# Patient Record
Sex: FEMALE | Race: BLACK OR AFRICAN AMERICAN | Employment: FULL TIME | RURAL
[De-identification: names, ages, dates, MRNs, and addresses within clinical notes are randomized per-mention and may not be internally consistent; named-entity substitution may affect disease eponyms.]

---

## 2018-11-02 ENCOUNTER — OFFICE VISIT (OUTPATIENT)
Dept: FAMILY MEDICINE CLINIC | Age: 43
End: 2018-11-02

## 2018-11-02 VITALS
RESPIRATION RATE: 18 BRPM | HEART RATE: 78 BPM | BODY MASS INDEX: 29.26 KG/M2 | OXYGEN SATURATION: 100 % | WEIGHT: 159 LBS | DIASTOLIC BLOOD PRESSURE: 80 MMHG | SYSTOLIC BLOOD PRESSURE: 126 MMHG | TEMPERATURE: 98 F | HEIGHT: 62 IN

## 2018-11-02 DIAGNOSIS — Z00.00 ROUTINE GENERAL MEDICAL EXAMINATION AT HEALTH CARE FACILITY: Primary | ICD-10-CM

## 2018-11-02 DIAGNOSIS — Z76.89 ENCOUNTER TO ESTABLISH CARE: ICD-10-CM

## 2018-11-02 NOTE — PATIENT INSTRUCTIONS
A Healthy Lifestyle: Care Instructions Your Care Instructions A healthy lifestyle can help you feel good, stay at a healthy weight, and have plenty of energy for both work and play. A healthy lifestyle is something you can share with your whole family. A healthy lifestyle also can lower your risk for serious health problems, such as high blood pressure, heart disease, and diabetes. You can follow a few steps listed below to improve your health and the health of your family. Follow-up care is a key part of your treatment and safety. Be sure to make and go to all appointments, and call your doctor if you are having problems. It's also a good idea to know your test results and keep a list of the medicines you take. How can you care for yourself at home? · Do not eat too much sugar, fat, or fast foods. You can still have dessert and treats now and then. The goal is moderation. · Start small to improve your eating habits. Pay attention to portion sizes, drink less juice and soda pop, and eat more fruits and vegetables. ? Eat a healthy amount of food. A 3-ounce serving of meat, for example, is about the size of a deck of cards. Fill the rest of your plate with vegetables and whole grains. ? Limit the amount of soda and sports drinks you have every day. Drink more water when you are thirsty. ? Eat at least 5 servings of fruits and vegetables every day. It may seem like a lot, but it is not hard to reach this goal. A serving or helping is 1 piece of fruit, 1 cup of vegetables, or 2 cups of leafy, raw vegetables. Have an apple or some carrot sticks as an afternoon snack instead of a candy bar. Try to have fruits and/or vegetables at every meal. 
· Make exercise part of your daily routine. You may want to start with simple activities, such as walking, bicycling, or slow swimming. Try to be active 30 to 60 minutes every day.  You do not need to do all 30 to 60 minutes all at once. For example, you can exercise 3 times a day for 10 or 20 minutes. Moderate exercise is safe for most people, but it is always a good idea to talk to your doctor before starting an exercise program. 
· Keep moving. Jaime Dryer the lawn, work in the garden, or misterbnb. Take the stairs instead of the elevator at work. · If you smoke, quit. People who smoke have an increased risk for heart attack, stroke, cancer, and other lung illnesses. Quitting is hard, but there are ways to boost your chance of quitting tobacco for good. ? Use nicotine gum, patches, or lozenges. ? Ask your doctor about stop-smoking programs and medicines. ? Keep trying. In addition to reducing your risk of diseases in the future, you will notice some benefits soon after you stop using tobacco. If you have shortness of breath or asthma symptoms, they will likely get better within a few weeks after you quit. · Limit how much alcohol you drink. Moderate amounts of alcohol (up to 2 drinks a day for men, 1 drink a day for women) are okay. But drinking too much can lead to liver problems, high blood pressure, and other health problems. Family health If you have a family, there are many things you can do together to improve your health. · Eat meals together as a family as often as possible. · Eat healthy foods. This includes fruits, vegetables, lean meats and dairy, and whole grains. · Include your family in your fitness plan. Most people think of activities such as jogging or tennis as the way to fitness, but there are many ways you and your family can be more active. Anything that makes you breathe hard and gets your heart pumping is exercise. Here are some tips: 
? Walk to do errands or to take your child to school or the bus. 
? Go for a family bike ride after dinner instead of watching TV. Where can you learn more? Go to http://constantin-nel.info/. Enter Y117 in the search box to learn more about \"A Healthy Lifestyle: Care Instructions. \" Current as of: December 7, 2017 Content Version: 11.8 © 1721-8183 Healthwise, [employer]. Care instructions adapted under license by [employer] (which disclaims liability or warranty for this information). If you have questions about a medical condition or this instruction, always ask your healthcare professional. [place] [address] any warranty or liability for your use of this information.

## 2018-11-02 NOTE — PROGRESS NOTES
Subjective:  
  
Devendra Rose is a 44 y.o. female with PMHx notable for sarcoid and thyroid nodules here today to establish care and for her annual physical exam. She has been doing well. No current concerns or complaints at this time. Thyroid nodules: managed by Endocrinology. Has not been seen in some time. Health Maintenance: · Cervical cancer screenin2017, normal per patient report. Reports h/o abnormal Pap in . · Mammogram: , normal per he report · Colonoscopy: has had for diarrhea and normal per report, ~15 years ago · Tetanus: 2014 · Influenza vaccine: 10/26/18 Current Outpatient Medications Medication Sig Dispense Refill  levonorgestrel (MIRENA) 20 mcg/24 hr (5 years) IUD 1 Device by IntraUTERine route once. Placed Aug 2017 Allergies Allergen Reactions  Latex, Natural Rubber Sneezing Past medical history - reviewed. Past Medical History:  
Diagnosis Date  Multiple thyroid nodules  Sarcoid  Uterine fibroid Social history - reviewed. Social History Tobacco Use  Smoking status: Never Smoker  Smokeless tobacco: Never Used Substance Use Topics  Alcohol use: Yes Frequency: Monthly or less Drinks per session: 1 or 2 Binge frequency: Never Family history - reviewed. Family History Problem Relation Age of Onset  Diabetes Mother  Hypertension Mother  No Known Problems Father Review of Systems Constitutional: negative for fevers and chills Eyes: negative for visual disturbance and irritation Ears, nose, mouth, throat, and face: negative for nasal congestion and sore throat Respiratory: negative for cough, sputum or dyspnea on exertion Cardiovascular: negative for chest pain, chest pressure/discomfort, palpitations, irregular heart beats, lower extremity edema Gastrointestinal: negative for nausea, vomiting, change in bowel habits and abdominal pain Genitourinary:negative for frequency, dysuria and hematuria Musculoskeletal:negative for myalgias and arthralgias Neurological: negative for headaches, dizziness and paresthesia Objective:  
 
Visit Vitals /80 (BP 1 Location: Right arm, BP Patient Position: Sitting) Comment: Manual  
Pulse 78 Temp 98 °F (36.7 °C) (Oral) Comment: . Resp 18 Ht 5' 2\" (1.575 m) Wt 159 lb (72.1 kg) LMP 10/17/2018 SpO2 100% BMI 29.08 kg/m² General appearance - alert, well appearing, and in no distress Eyes - pupils equal and reactive, extraocular eye movements intact, sclera anicteric Oropharyngx - mucous membranes moist, pharynx normal without lesions Neck - supple, no significant adenopathy Chest - clear to auscultation, no wheezes, rales or rhonchi, symmetric air entry, no tachypnea, retractions or cyanosis Heart - normal rate, regular rhythm, normal S1, S2, no murmurs, rubs, clicks or gallops Abdomen - soft, nontender, nondistended, no masses or organomegaly 
bowel sounds normal 
Extremities - peripheral pulses normal, no pedal edema, no clubbing or cyanosis Neurologic - alert, oriented, normal speech, no focal findings or movement disorder noted Skin - normal coloration and turgor, no rashes, no suspicious skin lesions noted Mental Status - alert, oriented to person, place, and time, normal mood, behavior, speech, dress, motor activity, and thought processes Assessment/Plan:  
Dorrine Hamman is a 44 y.o. female seen for:  
 
1. Routine general medical examination at health care facility: healthy, check fasting labs. - CBC WITH AUTOMATED DIFF 
- METABOLIC PANEL, COMPREHENSIVE 
- LIPID PANEL 
- THYROID CASCADE PROFILE 2. Body mass index (BMI) of 29.0-29.9 in adult: I have reviewed/discussed the above normal BMI with the patient. I have recommended the following interventions: dietary management education, guidance, and counseling and encourage exercise. 3. Encounter to establish care: previous medical records requested/  
 
 
I have discussed the diagnosis with the patient and the intended plan as seen in the above orders. The patient has received an after-visit summary and questions were answered concerning future plans. I have discussed medication side effects and warnings with the patient as well. Patient verbalizes understanding of plan of care and denies further questions or concerns at this time. Informed patient to return to the office if symptoms worsen or if new symptoms arise. Follow-up Disposition: 
Return in about 1 year (around 11/2/2019) for annual physical exam or sooner as needed.

## 2018-11-02 NOTE — PROGRESS NOTES
Identified pt with two pt identifiers(name and ). Chief Complaint Patient presents with [address] [place] [address] Care  Form Completion Health Maintenance Due Topic  DTaP/Tdap/Td series (1 - Tdap)  PAP AKA CERVICAL CYTOLOGY Wt Readings from Last 3 Encounters:  
18 159 lb (72.1 kg) Temp Readings from Last 3 Encounters:  
18 98 °F (36.7 °C) (Oral) BP Readings from Last 3 Encounters:  
18 126/80 Pulse Readings from Last 3 Encounters:  
18 78 Learning Assessment: 
:  
 
No flowsheet data found. Depression Screening: 
:  
 
PHQ over the last two weeks 2018 Little interest or pleasure in doing things Not at all Feeling down, depressed, irritable, or hopeless Not at all Total Score PHQ 2 0 Fall Risk Assessment: 
:  
 
No flowsheet data found. Abuse Screening: 
:  
 
Abuse Screening Questionnaire 2018 Do you ever feel afraid of your partner? Cosimo Blow Are you in a relationship with someone who physically or mentally threatens you? Cosimo Blow Is it safe for you to go home? Claudette Dunks Coordination of Care Questionnaire: 
:  
 
1) Have you been to an emergency room, urgent care clinic since your last visit? no  
Hospitalized since your last visit? no          
 
2) Have you seen or consulted any other health care providers outside of [address] [place] [address] since your last visit? no  (Include any pap smears or colon screenings in this section.) 3) Do you have an Advance Directive on file? no 
Are you interested in receiving information about Advance Directives? no 
 
Patient is accompanied by mother I have received verbal consent from Farzaneh Rasmussen to discuss any/all medical information while they are present in the room. Reviewed record in preparation for visit and have obtained necessary documentation. Medication reconciliation up to date and corrected with patient at this time.

## 2018-11-03 LAB
ALBUMIN SERPL-MCNC: 4.4 G/DL (ref 3.5–5.5)
ALBUMIN/GLOB SERPL: 1.5 {RATIO} (ref 1.2–2.2)
ALP SERPL-CCNC: 88 IU/L (ref 39–117)
ALT SERPL-CCNC: 22 IU/L (ref 0–32)
AST SERPL-CCNC: 26 IU/L (ref 0–40)
BASOPHILS # BLD AUTO: 0 X10E3/UL (ref 0–0.2)
BASOPHILS NFR BLD AUTO: 1 %
BILIRUB SERPL-MCNC: 0.4 MG/DL (ref 0–1.2)
BUN SERPL-MCNC: 9 MG/DL (ref 6–24)
BUN/CREAT SERPL: 11 (ref 9–23)
CALCIUM SERPL-MCNC: 9.4 MG/DL (ref 8.7–10.2)
CHLORIDE SERPL-SCNC: 102 MMOL/L (ref 96–106)
CHOLEST SERPL-MCNC: 162 MG/DL (ref 100–199)
CO2 SERPL-SCNC: 23 MMOL/L (ref 20–29)
CREAT SERPL-MCNC: 0.8 MG/DL (ref 0.57–1)
EOSINOPHIL # BLD AUTO: 0.2 X10E3/UL (ref 0–0.4)
EOSINOPHIL NFR BLD AUTO: 6 %
ERYTHROCYTE [DISTWIDTH] IN BLOOD BY AUTOMATED COUNT: 14.6 % (ref 12.3–15.4)
GLOBULIN SER CALC-MCNC: 3 G/DL (ref 1.5–4.5)
GLUCOSE SERPL-MCNC: 80 MG/DL (ref 65–99)
HCT VFR BLD AUTO: 42.6 % (ref 34–46.6)
HDLC SERPL-MCNC: 42 MG/DL
HGB BLD-MCNC: 13.9 G/DL (ref 11.1–15.9)
IMM GRANULOCYTES # BLD: 0 X10E3/UL (ref 0–0.1)
IMM GRANULOCYTES NFR BLD: 0 %
INTERPRETATION, 910389: NORMAL
LDLC SERPL CALC-MCNC: 107 MG/DL (ref 0–99)
LYMPHOCYTES # BLD AUTO: 0.9 X10E3/UL (ref 0.7–3.1)
LYMPHOCYTES NFR BLD AUTO: 28 %
MCH RBC QN AUTO: 26.4 PG (ref 26.6–33)
MCHC RBC AUTO-ENTMCNC: 32.6 G/DL (ref 31.5–35.7)
MCV RBC AUTO: 81 FL (ref 79–97)
MONOCYTES # BLD AUTO: 0.3 X10E3/UL (ref 0.1–0.9)
MONOCYTES NFR BLD AUTO: 8 %
NEUTROPHILS # BLD AUTO: 1.9 X10E3/UL (ref 1.4–7)
NEUTROPHILS NFR BLD AUTO: 57 %
PLATELET # BLD AUTO: 169 X10E3/UL (ref 150–379)
POTASSIUM SERPL-SCNC: 4.4 MMOL/L (ref 3.5–5.2)
PROT SERPL-MCNC: 7.4 G/DL (ref 6–8.5)
RBC # BLD AUTO: 5.27 X10E6/UL (ref 3.77–5.28)
SODIUM SERPL-SCNC: 137 MMOL/L (ref 134–144)
TRIGL SERPL-MCNC: 64 MG/DL (ref 0–149)
TSH SERPL DL<=0.005 MIU/L-ACNC: 0.9 UIU/ML (ref 0.45–4.5)
VLDLC SERPL CALC-MCNC: 13 MG/DL (ref 5–40)
WBC # BLD AUTO: 3.3 X10E3/UL (ref 3.4–10.8)

## 2018-11-06 ENCOUNTER — TELEPHONE (OUTPATIENT)
Dept: FAMILY MEDICINE CLINIC | Age: 43
End: 2018-11-06

## 2019-05-14 ENCOUNTER — OFFICE VISIT (OUTPATIENT)
Dept: FAMILY MEDICINE CLINIC | Age: 44
End: 2019-05-14

## 2019-05-14 VITALS
HEIGHT: 62 IN | RESPIRATION RATE: 18 BRPM | WEIGHT: 166.2 LBS | TEMPERATURE: 98.2 F | OXYGEN SATURATION: 99 % | HEART RATE: 82 BPM | SYSTOLIC BLOOD PRESSURE: 129 MMHG | DIASTOLIC BLOOD PRESSURE: 77 MMHG | BODY MASS INDEX: 30.59 KG/M2

## 2019-05-14 DIAGNOSIS — H65.112 ACUTE MUCOID OTITIS MEDIA OF LEFT EAR: Primary | ICD-10-CM

## 2019-05-14 RX ORDER — AMOXICILLIN 500 MG/1
CAPSULE ORAL
COMMUNITY
End: 2020-01-22

## 2019-05-14 RX ORDER — AMOXICILLIN AND CLAVULANATE POTASSIUM 875; 125 MG/1; MG/1
1 TABLET, FILM COATED ORAL 2 TIMES DAILY
Qty: 20 TAB | Refills: 0 | Status: SHIPPED | OUTPATIENT
Start: 2019-05-14 | End: 2019-05-24

## 2019-05-14 RX ORDER — CIPROFLOXACIN 500 MG/1
TABLET ORAL
COMMUNITY
End: 2020-01-22

## 2019-05-14 RX ORDER — CIPROFLOXACIN HYDROCHLORIDE 3.5 MG/ML
SOLUTION/ DROPS TOPICAL
COMMUNITY
End: 2020-01-22

## 2019-05-14 RX ORDER — AZELASTINE HYDROCHLORIDE 0.5 MG/ML
SOLUTION/ DROPS OPHTHALMIC
COMMUNITY
End: 2020-01-22

## 2019-05-14 RX ORDER — AZITHROMYCIN 250 MG/1
TABLET, FILM COATED ORAL
COMMUNITY
End: 2020-01-22

## 2019-05-14 NOTE — PATIENT INSTRUCTIONS

## 2019-05-14 NOTE — PROGRESS NOTES
HISTORY OF PRESENT ILLNESS  Raad Kohler is a 44 y.o. female. HPI  Pt presents with \"left ear pain\"    Pt has been dealing with ear pain for 4 days  She thought her symptoms were allergy related, and she has been taking Claritin D  Now, she is having acute pain in her left ear  Pain is radiating to her jaw as well  No fever  Review of Systems   Constitutional: Negative for fever. HENT: Positive for ear pain. Gastrointestinal: Negative for diarrhea and vomiting. Physical Exam   Constitutional: She is oriented to person, place, and time. She appears well-developed and well-nourished. HENT:   Head: Normocephalic and atraumatic. Right Ear: Hearing, tympanic membrane, external ear and ear canal normal.   Left Ear: Hearing, external ear and ear canal normal. Tympanic membrane is erythematous and retracted. Nose: Nose normal.   Mouth/Throat: Oropharynx is clear and moist.   Neck: Normal range of motion. Neck supple. Cardiovascular: Normal rate, regular rhythm and normal heart sounds. Pulmonary/Chest: Effort normal and breath sounds normal.   Lymphadenopathy:     She has no cervical adenopathy. Neurological: She is alert and oriented to person, place, and time. Skin: Skin is warm and dry. Psychiatric: She has a normal mood and affect. Her behavior is normal.       ASSESSMENT and PLAN    ICD-10-CM ICD-9-CM    1. Acute mucoid otitis media of left ear H65.112 381.02 amoxicillin-clavulanate (AUGMENTIN) 875-125 mg per tablet     Educated about taking medication as prescribed, with food  Should stay well hydrated, and treat fever as needed    Pt informed to return to office with worsening of symptoms, or PRN with any questions or concerns. Pt verbalizes understanding of plan of care and denies further questions or concerns at this time.

## 2019-05-14 NOTE — PROGRESS NOTES
All health maintenance and other pertinent information has been reviewed in preparation for today's office visit. Patient presents in the office today for:    Chief Complaint   Patient presents with    Ear Pain     Pt c/o left ear pain. Onset: 4 days. 1. Have you been to the ER, urgent care clinic since your last visit? Hospitalized since your last visit? No    2. Have you seen or consulted any other health care providers outside of the [address] [place] [address] since your last visit? Include any pap smears or colon screening.  No

## 2019-06-20 DIAGNOSIS — Z11.1 SCREENING FOR TUBERCULOSIS: Primary | ICD-10-CM

## 2020-01-22 ENCOUNTER — OFFICE VISIT (OUTPATIENT)
Dept: FAMILY MEDICINE CLINIC | Age: 45
End: 2020-01-22

## 2020-01-22 VITALS
TEMPERATURE: 98.1 F | HEART RATE: 77 BPM | WEIGHT: 167 LBS | SYSTOLIC BLOOD PRESSURE: 131 MMHG | RESPIRATION RATE: 18 BRPM | OXYGEN SATURATION: 100 % | BODY MASS INDEX: 30.73 KG/M2 | HEIGHT: 62 IN | DIASTOLIC BLOOD PRESSURE: 89 MMHG

## 2020-01-22 DIAGNOSIS — R20.0 RIGHT FACIAL NUMBNESS: Primary | ICD-10-CM

## 2020-01-22 DIAGNOSIS — Z86.19 HISTORY OF LYME DISEASE: ICD-10-CM
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
## 2020-01-22 DIAGNOSIS — Q67.0 ASYMMETRY OF FACE: ICD-10-CM

## 2020-01-22 NOTE — PROGRESS NOTES
Chief Complaint   Patient presents with    Facial Swelling     x 2 days right side of face , possible ear problem     1. Have you been to the ER, urgent care clinic since your last visit? Hospitalized since your last visit? No    2. Have you seen or consulted any other health care providers outside of the [address] [place] [address] since your last visit? Include any pap smears or colon screening.  No

## 2020-01-22 NOTE — PROGRESS NOTES
Taras Mackay is a 38 y.o. female here today to address the following issues:  Chief Complaint   Patient presents with    Facial Swelling     x 2 days right side of face , possible ear problem     Patient here with concerns of right-sided facial swelling and numbness. This started about 2 days ago. She denies any other focal weaknesses. She has a history of hypothyroidism. She states she is due for a thyroid check. She is currently not on medication. She also has has a history of tick bites and Lyme disease. She denies any recent rashes. There is no slurred speech. Patient also has a history of sarcoid. Past Medical History:   Diagnosis Date    Lyme disease     7 years    Multiple thyroid nodules     Sarcoid     Uterine fibroid      Past Surgical History:   Procedure Laterality Date    HX HERNIA REPAIR       Social History     Socioeconomic History    Marital status: SINGLE     Spouse name: Not on file    Number of children: Not on file    Years of education: Not on file    Highest education level: Not on file   Occupational History    Not on file   Social Needs    Financial resource strain: Not on file    Food insecurity:     Worry: Not on file     Inability: Not on file    Transportation needs:     Medical: Not on file     Non-medical: Not on file   Tobacco Use    Smoking status: Never Smoker    Smokeless tobacco: Never Used   Substance and Sexual Activity    Alcohol use: Yes     Frequency: Monthly or less     Drinks per session: 1 or 2     Binge frequency: Never    Drug use: No    Sexual activity: Yes     Partners: Male     Birth control/protection: I.U.D.    Lifestyle    Physical activity:     Days per week: Not on file     Minutes per session: Not on file    Stress: Not on file   Relationships    Social connections:     Talks on phone: Not on file     Gets together: Not on file     Attends Restorationist service: Not on file     Active member of club or organization: Not on file Attends meetings of clubs or organizations: Not on file     Relationship status: Not on file    Intimate partner violence:     Fear of current or ex partner: Not on file     Emotionally abused: Not on file     Physically abused: Not on file     Forced sexual activity: Not on file   Other Topics Concern    Not on file   Social History Narrative    Not on file       Allergies   Allergen Reactions    Latex, Natural Rubber Sneezing       Current Outpatient Medications   Medication Sig    levonorgestrel (MIRENA) 20 mcg/24 hr (5 years) IUD 1 Device by IntraUTERine route once. Placed Aug 2017     No current facility-administered medications for this visit. Review of Systems   Constitutional: Negative for chills and fever. HENT: Negative for congestion and ear pain. Eyes: Negative for blurred vision. Respiratory: Negative for shortness of breath and wheezing. Cardiovascular: Negative for chest pain, palpitations and leg swelling. Gastrointestinal: Negative for abdominal pain, diarrhea, nausea and vomiting. Skin: Negative for rash. Neurological: Positive for sensory change. Negative for speech change. Visit Vitals  /89 (BP 1 Location: Left arm, BP Patient Position: Sitting)   Pulse 77   Temp 98.1 °F (36.7 °C) (Oral)   Resp 18   Ht 5' 2\" (1.575 m)   Wt 167 lb (75.8 kg)   SpO2 100%   BMI 30.54 kg/m²       Physical Exam  General: Well-developed, well-nourished, in no acute distress   Neck: Supple, non-tender, no lymphadenopathy, no carotid bruits   Lungs: Normal symmetric bilateral chest movement. Clear to auscultation bilaterally   CV: Regular rate and rhythm. No murmur or gallops auscultated    Abdomen: Soft, non-tender, bowel sounds positive, no rebound or guarding  Extremities: No edema.      Neurological Exam   Mental status: Alert and oriented to person, place and situation  Language: normal fluency and comprehension; no dysarthria  CN II-XII: PATRICK; EOMI; no nystagmus Fundoscopic exam benign. Visual fields full to confrontation   Facial sensation intact in V1, V2, V3 distribution bilaterally  Face appears asymmetric, when puffing out her cheeks she is limited on the right side and facial strength normal   Hearing is intact to casual conversation. Palate elevates symmetrically  Normal shoulder shrug bilaterally  Tongue protrudes midline, no atrophy, no fasciculations   Sensory: Decreased sensation on the right side of her face otherwise intact light touch and position elsewhere  Motor: Normal bulk, tone, and strength in all 4 extremities. Reflexes: DTRs are symmetric, 2+, no sustained clonus   Coordination: No disdiadochokinesia with normal rapid alternating movements. Normal heel to shin bilaterally and finger to nose. Gait: normal stance and stride  Romberg: negative       No results found for this or any previous visit (from the past 12 hour(s)). 1. Right facial numbness  - CBC WITH AUTOMATED DIFF; Future  - METABOLIC PANEL, COMPREHENSIVE; Future  - TSH 3RD GENERATION; Future  - SED RATE (ESR); Future  - CRP, HIGH SENSITIVITY; Future  - MRI BRAIN W WO CONT; Future  - LIPID PANEL; Future    2. Asymmetry of face  - CBC WITH AUTOMATED DIFF; Future  - METABOLIC PANEL, COMPREHENSIVE; Future  - TSH 3RD GENERATION; Future  - SED RATE (ESR); Future  - CRP, HIGH SENSITIVITY; Future  - MRI BRAIN W WO CONT; Future  - LIPID PANEL; Future    3. Tick bite, sequela  - LYME AB TOTAL W/RFLX W BLOT; Future    4. History of Lyme disease  - LYME AB TOTAL W/RFLX W BLOT; Future    Given that this began about 2 days, will get labs above and order MRI. If any worsening recommend going to the emergency room immediately. If work-up negative, consider trigeminal neuralgia as a possible cause of her symptoms. Will need close follow-up and monitoring. Follow-up and Dispositions    · Return in about 1 week (around 1/29/2020) for Follow up .            Mohan Ordoñez MD, CAQSM, RMSK

## 2020-01-23 ENCOUNTER — HOSPITAL ENCOUNTER (OUTPATIENT)
Dept: MRI IMAGING | Age: 45
Discharge: HOME | End: 2020-01-23
Attending: FAMILY MEDICINE
Payer: COMMERCIAL

## 2020-01-23 ENCOUNTER — TELEPHONE (OUTPATIENT)
Dept: FAMILY MEDICINE CLINIC | Age: 45
End: 2020-01-23

## 2020-01-23 ENCOUNTER — HOSPITAL ENCOUNTER (OUTPATIENT)
Dept: LAB | Age: 45
Discharge: HOME | End: 2020-01-23

## 2020-01-23 VITALS — BODY MASS INDEX: 30.18 KG/M2 | WEIGHT: 165 LBS

## 2020-01-23 DIAGNOSIS — Q67.0 ASYMMETRY OF FACE: ICD-10-CM

## 2020-01-23 DIAGNOSIS — D86.89 NEUROSARCOIDOSIS: Primary | ICD-10-CM
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
## 2020-01-23 DIAGNOSIS — J01.00 ACUTE MAXILLARY SINUSITIS, RECURRENCE NOT SPECIFIED: ICD-10-CM

## 2020-01-23 DIAGNOSIS — R20.0 RIGHT FACIAL NUMBNESS: ICD-10-CM

## 2020-01-23 DIAGNOSIS — Z86.19 HISTORY OF LYME DISEASE: ICD-10-CM

## 2020-01-23 LAB
ALBUMIN SERPL-MCNC: 3.7 G/DL (ref 3.5–5)
ALBUMIN/GLOB SERPL: 0.9 {RATIO} (ref 1.1–2.2)
ALP SERPL-CCNC: 94 U/L (ref 45–117)
ALT SERPL-CCNC: 34 U/L (ref 12–78)
ANION GAP SERPL CALC-SCNC: 3 MMOL/L (ref 5–15)
AST SERPL-CCNC: 23 U/L (ref 15–37)
BASOPHILS # BLD: 0 K/UL (ref 0–0.1)
BASOPHILS NFR BLD: 1 % (ref 0–1)
BILIRUB SERPL-MCNC: 0.4 MG/DL (ref 0.2–1)
BUN SERPL-MCNC: 15 MG/DL (ref 6–20)
BUN/CREAT SERPL: 18 (ref 12–20)
CALCIUM SERPL-MCNC: 9.2 MG/DL (ref 8.5–10.1)
CHLORIDE SERPL-SCNC: 106 MMOL/L (ref 97–108)
CHOLEST SERPL-MCNC: 172 MG/DL
CO2 SERPL-SCNC: 29 MMOL/L (ref 21–32)
COMMENT, HOLDF: NORMAL
CREAT SERPL-MCNC: 0.83 MG/DL (ref 0.55–1.02)
CRP SERPL HS-MCNC: 1.7 MG/L
DIFFERENTIAL METHOD BLD: ABNORMAL
EOSINOPHIL # BLD: 0.2 K/UL (ref 0–0.4)
EOSINOPHIL NFR BLD: 5 % (ref 0–7)
ERYTHROCYTE [DISTWIDTH] IN BLOOD BY AUTOMATED COUNT: 14 % (ref 11.5–14.5)
ERYTHROCYTE [SEDIMENTATION RATE] IN BLOOD: 4 MM/HR (ref 0–20)
GLOBULIN SER CALC-MCNC: 3.9 G/DL (ref 2–4)
GLUCOSE SERPL-MCNC: 82 MG/DL (ref 65–100)
HCT VFR BLD AUTO: 45.5 % (ref 35–47)
HDLC SERPL-MCNC: 48 MG/DL
HDLC SERPL: 3.6 {RATIO} (ref 0–5)
HGB BLD-MCNC: 14.1 G/DL (ref 11.5–16)
IMM GRANULOCYTES # BLD AUTO: 0 K/UL (ref 0–0.04)
IMM GRANULOCYTES NFR BLD AUTO: 0 % (ref 0–0.5)
LDLC SERPL CALC-MCNC: 112.4 MG/DL (ref 0–100)
LIPID PROFILE,FLP: ABNORMAL
LYMPHOCYTES # BLD: 0.7 K/UL (ref 0.8–3.5)
LYMPHOCYTES NFR BLD: 20 % (ref 12–49)
MCH RBC QN AUTO: 26.3 PG (ref 26–34)
MCHC RBC AUTO-ENTMCNC: 31 G/DL (ref 30–36.5)
MCV RBC AUTO: 84.7 FL (ref 80–99)
MONOCYTES # BLD: 0.4 K/UL (ref 0–1)
MONOCYTES NFR BLD: 11 % (ref 5–13)
NEUTS SEG # BLD: 2 K/UL (ref 1.8–8)
NEUTS SEG NFR BLD: 63 % (ref 32–75)
NRBC # BLD: 0 K/UL (ref 0–0.01)
NRBC BLD-RTO: 0 PER 100 WBC
PLATELET # BLD AUTO: 178 K/UL (ref 150–400)
PMV BLD AUTO: 11 FL (ref 8.9–12.9)
POTASSIUM SERPL-SCNC: 4.4 MMOL/L (ref 3.5–5.1)
PROT SERPL-MCNC: 7.6 G/DL (ref 6.4–8.2)
RBC # BLD AUTO: 5.37 M/UL (ref 3.8–5.2)
RBC MORPH BLD: ABNORMAL
SAMPLES BEING HELD,HOLD: NORMAL
SODIUM SERPL-SCNC: 138 MMOL/L (ref 136–145)
TRIGL SERPL-MCNC: 58 MG/DL (ref ?–150)
TSH SERPL DL<=0.05 MIU/L-ACNC: 1.68 UIU/ML (ref 0.36–3.74)
VLDLC SERPL CALC-MCNC: 11.6 MG/DL
WBC # BLD AUTO: 3.3 K/UL (ref 3.6–11)

## 2020-01-23 PROCEDURE — 70553 MRI BRAIN STEM W/O & W/DYE: CPT

## 2020-01-23 PROCEDURE — 74011250636 HC RX REV CODE- 250/636: Performed by: RADIOLOGY

## 2020-01-23 PROCEDURE — A9575 INJ GADOTERATE MEGLUMI 0.1ML: HCPCS | Performed by: RADIOLOGY

## 2020-01-23 RX ORDER — AMOXICILLIN AND CLAVULANATE POTASSIUM 875; 125 MG/1; MG/1
1 TABLET, FILM COATED ORAL EVERY 12 HOURS
Qty: 20 TAB | Refills: 0 | Status: SHIPPED | OUTPATIENT
Start: 2020-01-23 | End: 2020-02-02

## 2020-01-23 RX ORDER — GADOTERATE MEGLUMINE 376.9 MG/ML
15 INJECTION INTRAVENOUS
Status: COMPLETED | OUTPATIENT
Start: 2020-01-23 | End: 2020-01-23

## 2020-01-23 RX ORDER — PREDNISONE 20 MG/1
0.5 TABLET ORAL
Qty: 28 TAB | Refills: 0 | Status: SHIPPED | OUTPATIENT
Start: 2020-01-23 | End: 2020-02-06

## 2020-01-23 RX ADMIN — GADOTERATE MEGLUMINE 15 ML: 376.9 INJECTION INTRAVENOUS at 14:48

## 2020-01-23 NOTE — TELEPHONE ENCOUNTER
I touched base with the patient. Tried to call Radiology, but no answer. 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 Referral made to Neurology and MRI of face ordered.

## 2020-01-23 NOTE — PROGRESS NOTES
Tried to call Radiology, but no answer. 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 Referral made to Neurology and MRI of face ordered.

## 2020-01-24 ENCOUNTER — TELEPHONE (OUTPATIENT)
Dept: FAMILY MEDICINE CLINIC | Age: 45
End: 2020-01-24

## 2020-01-24 LAB — B BURGDOR IGG+IGM SER-ACNC: <0.91 ISR (ref 0–0.9)

## 2020-01-24 NOTE — TELEPHONE ENCOUNTER
Anders Keenan with [facility] Radiology Dept calling, asking that doctor be aware of results of yesterday for MRI BRAIN W/WO CONTRAST      Questions call [phone]

## 2020-01-28 ENCOUNTER — HOSPITAL ENCOUNTER (OUTPATIENT)
Dept: MRI IMAGING | Age: 45
Discharge: HOME | End: 2020-01-28
Attending: FAMILY MEDICINE
Payer: COMMERCIAL

## 2020-01-28 VITALS — WEIGHT: 154 LBS | BODY MASS INDEX: 28.17 KG/M2

## 2020-01-28 DIAGNOSIS — D86.89 NEUROSARCOIDOSIS: ICD-10-CM

## 2020-01-28 PROCEDURE — 74011250636 HC RX REV CODE- 250/636: Performed by: RADIOLOGY

## 2020-01-28 PROCEDURE — A9575 INJ GADOTERATE MEGLUMI 0.1ML: HCPCS | Performed by: RADIOLOGY

## 2020-01-28 PROCEDURE — 70543 MRI ORBT/FAC/NCK W/O &W/DYE: CPT

## 2020-01-28 RX ORDER — GADOTERATE MEGLUMINE 376.9 MG/ML
13 INJECTION INTRAVENOUS
Status: COMPLETED | OUTPATIENT
Start: 2020-01-28 | End: 2020-01-28

## 2020-01-28 RX ADMIN — GADOTERATE MEGLUMINE 13 ML: 376.9 INJECTION INTRAVENOUS at 22:40

## 2020-02-03 DIAGNOSIS — D86.9 SARCOIDOSIS: ICD-10-CM

## 2020-02-03 DIAGNOSIS — D86.89 NEUROSARCOIDOSIS: Primary | ICD-10-CM

## 2020-02-03 RX ORDER — PREDNISONE 10 MG/1
TABLET ORAL
Qty: 18 TAB | Refills: 0 | Status: SHIPPED | OUTPATIENT
Start: 2020-02-03 | End: 2022-01-12

## 2020-02-03 NOTE — PROGRESS NOTES
Patient doing better. After 2 weeks of 40mg, will taper steroids. Has appt with Neurology and will also refer to Pulm to follow up as patient was told by her last pulmonologist that there was evidence of sarcoidosis in her lungs.

## 2020-02-19 DIAGNOSIS — D86.9 SARCOIDOSIS: Primary | ICD-10-CM

## 2020-02-27 ENCOUNTER — OFFICE VISIT (OUTPATIENT)
Dept: NEUROLOGY | Age: 45
End: 2020-02-27

## 2020-02-27 VITALS
BODY MASS INDEX: 30.36 KG/M2 | SYSTOLIC BLOOD PRESSURE: 110 MMHG | HEART RATE: 77 BPM | WEIGHT: 165 LBS | OXYGEN SATURATION: 98 % | DIASTOLIC BLOOD PRESSURE: 80 MMHG | HEIGHT: 62 IN

## 2020-02-27 DIAGNOSIS — R20.0 NUMBNESS AND TINGLING IN RIGHT HAND: ICD-10-CM

## 2020-02-27 DIAGNOSIS — D86.89 NEUROSARCOIDOSIS IN ADULT: Primary | ICD-10-CM

## 2020-02-27 DIAGNOSIS — D86.89 NEUROSARCOIDOSIS IN ADULT: ICD-10-CM

## 2020-02-27 DIAGNOSIS — R20.2 NUMBNESS AND TINGLING IN RIGHT HAND: ICD-10-CM

## 2020-02-27 DIAGNOSIS — R90.89 ABNORMAL BRAIN MRI: ICD-10-CM

## 2020-02-27 NOTE — PROGRESS NOTES
Numbness of the face down to jaw happened last month. Dx with sarcoidosis by PCP and that why she was referred here. Has noticed numbness and weakness of the right hand. Ear on right side has been bothering here; sounds like a fan in her ear. She is forgetful but not sure if it has anything to do with neurology. 2 MRIs done at [facility].

## 2020-02-27 NOTE — PROGRESS NOTES
EMG/ NCS Report  [facility], [address]  Ph: [phone]/ [phone]  FAX: [phone]/ [phone]  Test Date:  2020    Patient: Ronen Mahmood : 1975 Physician: Alfredo Gold   Sex: Female Height: ' \" Ref Phys: Vielka Stovall MD   ID#: [id number] Weight:  lbs. Technician: Leigh Hernandez     Patient History / Exam:  CC: VISUAL DISTURBANCE          EMG & NCV Findings:    Impression:        ___________________________  JOCELIN PARKS MD              VEP     Trace N75 (ms) P100 (ms) N145 (ms) L95-S405 (µV)   Norm  <117     *L : O1-Cz 72.3 94.9 119.9 3.61   *R : O1-Cz 80.1 96.9 123.0 2.49   L-R Norm  <7     L-R 7.8 2.0 3.1 1.12   *A 92 millisecond delay was used for the monitor.                Waveforms:

## 2020-02-27 NOTE — PROGRESS NOTES
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 She tells me that she was diagnosed with sarcoid, pulmonary, about 7 years ago. She was on prednisone for a while and follow-up with pulmonary and was weaned off. She now every-2-year follow-up with pulmonary. In any regard about a month ago she started to have difficulty with pain in her ear and numbness in her face. She also felt like she could not move her face properly. She underwent MRI scanning of the brain and then subsequently MRI of the orbits and face at the end of January. Those 2 MRIs dated January 23 and January 28 are personally reviewed. She has leptomeningeal enhancement along the V3 distribution of the right trigeminal nerve as well as the V2 division. Chest x-ray shows hilar lymphadenopathy. She is also having some numbness in her right hand. She has difficulty opening things. She sometimes drops things. Left side is fine. Legs are fine. No shortness of breath. No fever. No chills. No injury. Her vision has been decreased in terms of acuity. She had an eye exam and was told everything looked fine and that she was just getting older. No difficulty with ambulation. No rash. No chest pain.   No palpitations      Past Medical History:   Diagnosis Date    Lyme disease     7 years    Multiple thyroid nodules     Sarcoid     Uterine fibroid        Past Surgical History:   Procedure Laterality Date    HX HERNIA REPAIR         Current Outpatient Medications   Medication Sig Dispense Refill    levonorgestrel (MIRENA) 20 mcg/24 hr (5 years) IUD 1 Device by IntraUTERine route once. Placed Aug 2017      predniSONE (DELTASONE) 10 mg tablet Take 3 tabs for 3 days, 2 tabs for 3 days and then 1 tab for 3 days 18 Tab 0        Allergies   Allergen Reactions    Latex, Natural Rubber Sneezing       Social History     Tobacco Use    Smoking status: Never Smoker    Smokeless tobacco: Never Used   Substance Use Topics    Alcohol use: Yes     Frequency: Monthly or less     Drinks per session: 1 or 2     Binge frequency: Never    Drug use: No       Family History   Problem Relation Age of Onset    Diabetes Mother     Hypertension Mother     No Known Problems Father        Review of Systems  Pertinent positives and negatives as noted with remainder of comprehensive review negative      Examination  Visit Vitals  /80   Pulse 77   Ht 5' 2\" (1.575 m)   Wt 74.8 kg (165 lb)   SpO2 98%   BMI 30.18 kg/m²   Pleasant, well appearing. Dress and grooming are appropriate. No scleral icterus is present. Oropharynx is clear. Supple neck without bruit appreciated. Heart regular. Pulses are symmetrical.  No edema in the lower extremities. Neurologically, she is awake, alert, and oriented with normal speech and language. Her cognition is normal.    Intact cranial nerves 2-12. No nystagmus. Visual fields full to confrontation. Disk margins are flat bilaterally. She has normal bulk and tone. She has no abnormal movement. She has no pronation or drift. She generates full strength in the upper and lower extremities to direct confrontational testing. Reflexes are symmetrical in the upper and lower extremities bilaterally. No pathologic reflexes are elicited. .  Finger nose finger and rapid alternating movements are normal.  Steady gait. No sensory deficit to primary modalities.      Impression/Plan  52-year-old woman with pain in the ear difficulty with moving her face right hand numbness which has improved with an MRI scan suggestive of neurosarcoidosis in this lady with a known diagnosed sarcoid. Certainly we do need to get a more definitive diagnosis although this is the most likely, we also need to exclude CNS metastatic disease versus infectious versus other. At this point we will send her for lumbar puncture under fluoroscopy with Dr. Kaleigh Cuello as well as a visual evoked potential and EMG. We discussed treatment options including steroid versus other and certainly there are risks of long-term prednisone use as well as risk of other immunosuppressant type agents and we will have to discuss this further. We will see her back after tests and a more definitive diagnosis has been made. If the CSF is positive then this is categorized as probable neurosarcoid as we would not have a true tissue diagnosis but certainly the CSF lending itself with her known diagnosis as well as the appearance on MRI would exclude other ominous etiologies and solidify this. Grace Fortune MD    This note was created using voice recognition software. Despite editing, there may be syntax errors. This note will not be viewable in [address]

## 2020-02-28 NOTE — PROCEDURES
[facility] AT [place]   Visual Evoked Potential Report    Date of Service: 2/27/2020  Referring: Dr. Jessica Alcantar  Indication: Visual Disturbance    Bilateral full field pattern reversal visual evoked potential is performed. Waveforms are appropriate for interpretation. Absolute latency of the P100 is normal bilaterally.       Normal Study    Ra Blakely MD

## 2020-03-12 ENCOUNTER — TELEPHONE (OUTPATIENT)
Dept: NEUROLOGY | Age: 45
End: 2020-03-12

## 2020-03-12 ENCOUNTER — OFFICE VISIT (OUTPATIENT)
Dept: NEUROLOGY | Age: 45
End: 2020-03-12

## 2020-03-12 DIAGNOSIS — R20.2 NUMBNESS AND TINGLING IN RIGHT HAND: Primary | ICD-10-CM

## 2020-03-12 DIAGNOSIS — R20.0 NUMBNESS AND TINGLING IN RIGHT HAND: Primary | ICD-10-CM

## 2020-03-12 NOTE — PROCEDURES
EMG/ NCS Report  DR[facility] REHABILITATION  - [facility]  [facility]  [facility], [place], [address] [place], [address]   Ph: [phone]   FAX: [phone]/ [phone]  Test Date:  3/12/2020    Patient: Shailesh Mendez : 1975 Physician: Valerie Cruz MD   Sex: Female Height: ' \" Ref Phys: David Perez MD   ID#: [id number] Weight:  lbs. Technician: Ilsa Oneill     Patient History / Exam:  Patient comes in with intermittent numbness of the whole right hand for the past 3 months. (-) weakness (-) wrist pain (-) neck pain    Patient is coming for neuropathy evaluation. EMG & NCV Findings:  Evaluation of the right median motor nerve showed prolonged distal onset latency (5.0 ms), normal amplitude (12.3 mV), and normal conduction velocity (Elbow-Wrist, 56 m/s). The right ulnar motor nerve showed normal distal onset latency (2.7 ms), normal amplitude (11.2 mV), normal conduction velocity (B Elbow-Wrist, 73 m/s), and normal conduction velocity (A Elbow-B Elbow, 50 m/s). The right median sensory nerve showed prolonged distal peak latency (4.5 ms) and normal amplitude (22.9 µV). The right radial sensory and the right ulnar sensory nerves showed normal distal peak latency (R1.8, R2.8 ms) and normal amplitude (R57.7, R71.0 µV). All F Wave latencies were within normal limits. All examined muscles (as indicated in the following table) showed no evidence of electrical instability. Impression:  ABNORMAL    Extensive electrodiagnostic examination of the right upper extremity shows evidence of a right median mononeuropathy at or distal to the wrist, consistent with a clinical diagnosis of carpal tunnel syndrome, mild to moderate in degree electrically. There is no evidence of a right cervical motor radiculopathy.         Valerie Cruz MD  Diplomate, American Board of Psychiatry and Neurology  Diplomate, Neuromuscular Medicine  Diplomate, American Board of Electrodiagnostic Medicine  Director, [address] [place] [address] Accredited Laboratory with Exemplary Status          Nerve Conduction Studies  Anti Sensory Summary Table     Stim Site NR Peak (ms) Norm Peak (ms) P-T Amp (µV) Norm P-T Amp Site1 Site2 Dist (cm)   Right Median Anti Sensory (2nd Digit)  29.6°C   Wrist    4.5 <4 22.9 >13 Wrist 2nd Digit 14.0   Right Radial Anti Sensory (Base 1st Digit)  30.3°C   Wrist    1.8 <2.8 57.7 >11 Wrist Base 1st Digit 10.0   Right Ulnar Anti Sensory (5th Digit)  30.1°C   Wrist    2.8 <4.0 71.0 >9 Wrist 5th Digit 14.0     Motor Summary Table     Stim Site NR Onset (ms) Norm Onset (ms) O-P Amp (mV) Norm O-P Amp Amp (Prev) (%) Site1 Site2 Dist (cm) Oscar (m/s) Norm Oscar (m/s)   Right Median Motor (Abd Poll Brev)  30.3°C   Wrist    5.0 <4.5 12.3 >4.1 100.0 Wrist Abd Poll Brev 8.0     Elbow    8.4  11.9  96.7 Elbow Wrist 19.0 56 >49   Right Ulnar Motor (Abd Dig Minimi)  29.7°C   Wrist    2.7 <3.1 11.2 >7.0 100.0 Wrist Abd Dig Minimi 8.0  >50   B Elbow    5.3  10.9  97.3 B Elbow Wrist 19.0 73 >50   A Elbow    7.3  10.3  94.5 A Elbow B Elbow 10.0 50 >50     F Wave Studies     NR F-Lat (ms) Lat Norm (ms) L-R F-Lat (ms) L-R Lat Norm   Right Ulnar (Mrkrs) (Abd Dig Min)  29°C      23.63 <32  <2.5     EMG     Side Muscle Nerve Root Ins Act Fibs Psw Recrt Duration Amp Poly Comment   Right 1stDorInt Ulnar C8-T1 Nml Nml Nml Nml Nml Nml Nml    Right ExtIndicis Radial (Post Int) C7-8 Nml Nml Nml Nml Nml Nml Nml    Right Abd Poll Brev Median C8-T1 Nml Nml Nml Nml Nml Nml Nml    Right Biceps Musculocut C5-6 Nml Nml Nml Nml Nml Nml Nml    Right Triceps Radial C6-7-8 Nml Nml Nml Nml Nml Nml Nml    Right Deltoid Axillary C5-6 Nml Nml Nml Nml Nml Nml Nml    Right Lower Cerv Parasp Rami C7,T1 Nml Nml Nml Nml Nml Nml Nml                Nerve Conduction Studies  Anti Sensory Left/Right Comparison     Stim Site L Lat (ms) R Lat (ms) L-R Lat (ms) L Amp (µV) R Amp (µV) L-R Amp (%) Site1 Site2 L Oscar (m/s) R Oscar (m/s) L-R Oscar (m/s)   Median Anti Sensory (2nd Digit)  29.6°C   Wrist  3.6   22.9  Wrist 2nd Digit  39    Radial Anti Sensory (Base 1st Digit)  30.3°C   Wrist  1.4   57.7  Wrist Base 1st Digit  71    Ulnar Anti Sensory (5th Digit)  30.1°C   Wrist  2.1   71.0  Wrist 5th Digit  67      Motor Left/Right Comparison     Stim Site L Lat (ms) R Lat (ms) L-R Lat (ms) L Amp (mV) R Amp (mV) L-R Amp (%) Site1 Site2 L Oscar (m/s) R Oscar (m/s) L-R Oscar (m/s)   Median Motor (Abd Poll Brev)  30.3°C   Wrist  5.0   12.3  Wrist Abd Poll Brev      Elbow  8.4   11.9  Elbow Wrist  56    Ulnar Motor (Abd Dig Minimi)  29.7°C   Wrist  2.7   11.2  Wrist Abd Dig Minimi      B Elbow  5.3   10.9  B Elbow Wrist  73    A Elbow  7.3   10.3  A Elbow B Elbow  50          Waveforms:

## 2020-03-12 NOTE — LETTER
3/14/2020 9:28 AM 
 
Ms. Eduard Aponte 
[address] [facility] [id number] Dear Eduard Aponte, 
 
I reviewed your EMG. Appears you have carpal tunnel syndrome. If you would like to be evaluated for this or try an injection before surgery, please let me know. If you have any questions or concerns, please send me a message in [employer] and I will get back to you immediately usually within 24-48 hours. If you would like to call, please call our office at [phone] and my staff will try to assist you. Please leave a detailed message with your question or request and we will get back to you as soon as possible or when I return to the office. For any acute concerns or worsening of any condition, please schedule a follow up appointment with the first available physician. Sincerely, Denver Alexandria, MD, FAAFP, CAQSM, RMSK

## 2020-03-20 NOTE — TELEPHONE ENCOUNTER
Lumbar Puncture does NOT REQUIRE prior authorization, according to Precertification Rep at [address] [facility]. Information given to [facility] and Dr. Paulo Javier to schedule.
Please discuss with Leena Foster LPN as I believe she was scheduling at [facility] on my procedure day.       For any of these LPs, please ensure the requesting Physician has entered the CSF orders for the LP
mydhart sent to pain
No

## 2020-08-18 NOTE — PROGRESS NOTES
[address]     CHIEF COMPLAINT:   Chief Complaint   Patient presents with    Physical     Patient presents for a college physical.        HISTORY OF PRESENT ILLNESS:  Helen Chang is a 38 y.o. female who presents for college physical.    CARDIAC SCREENING:  No Family history of early onset CAD, Myocardial Infarction, Stroke  Social/Past Medical/Family History reviewed in respective sections. Last metabolic panel fasting blood glucose 80 (11/2018)  Last lipid panel-within normal limits (11/2018)    CANCER SCREENING:  ROS: Denies fevers, night sweats, chills, unintentional weight loss  Cervical cancer screening: last in 2019, Up-to-date sees gynecologist  Mammogram: last in 2017  Colon Cancer screening: Not indicated  Lung Cancer: Not indicated    INFECTIOUS DISEASE SCREENING:  Immunizations: Up to date  Exposures to infectious diseases: No known exposure to STD  Sexually active. 1 partner. Is not interested in screening today. MENTAL HEALTH SCREENING:  ROS: Denies low mood, insomnia/sleeping too much, guilt, loss of interest in previously pleasurable things, anxiety  PHQ-2 neg    ISSUES DISCUSSED OUTSIDE OF WELLNESS:  None    PMHx:  Past Medical History:   Diagnosis Date    Lyme disease     7 years    Multiple thyroid nodules     Sarcoid     Uterine fibroid        Meds:   Current Outpatient Medications   Medication Sig Dispense Refill    levonorgestrel (MIRENA) 20 mcg/24 hr (5 years) IUD 1 Device by IntraUTERine route once. Placed Aug 2017      predniSONE (DELTASONE) 10 mg tablet Take 3 tabs for 3 days, 2 tabs for 3 days and then 1 tab for 3 days 18 Tab 0       Allergies:    Allergies   Allergen Reactions    Latex, Natural Rubber Sneezing       Smoker:  Social History     Tobacco Use   Smoking Status Never Smoker   Smokeless Tobacco Never Used       ETOH:   Social History     Substance and Sexual Activity   Alcohol Use Yes    Frequency: Monthly or less    Drinks per session: 1 or 2    Binge frequency: Never       FH:   Family History   Problem Relation Age of Onset    Diabetes Mother     Hypertension Mother     No Known Problems Father        ROS:  Negative unless stated in HPI. PHYSICAL EXAM:  VITAL SIGNS:   Visit Vitals  /68 (BP 1 Location: Left arm, BP Patient Position: Sitting)   Pulse 67   Temp 97.1 °F (36.2 °C) (Temporal)   Resp 16   Ht 5' 1.75\" (1.568 m)   Wt 169 lb 4 oz (76.8 kg)   SpO2 97%   BMI 31.21 kg/m²       GENERAL:  WN/WD, female, in NAD,   HEENT:  Atraumatic, normocephalic, no auricular deformities, hearing intact,   NECK:  Adequate ROM, trachea midline, supple, non-tender with no    lymphadenopathy or thyromegaly  HEART:  Regular rate and rhythm; no edema, no JVD, circulation intact, extremities warm and well perfused distal pulses intact  LUNGS: Clear to auscultation bilaterally; no resp dist, SoB, audible wheeze, accessory muscle use or air hunger  SKIN:   Warm, dry, intact, non-erythemic, no ulcers, or rashes     LABORATORY STUDIES:  No current labs at this time    IMAGING STUDIES:  No current imaging at this time    ASSESSMENT:    ICD-10-CM ICD-9-CM    1. Screening examination for infectious disease  Z11.9 V75.9 MEASLES/MUMPS/RUBELLA IMMUNITY      VZV AB, IGG      RUBEOLA AB, IGG      QUANTIFERON-TB GOLD PLUS      DRUG SCREEN, URINE      HEPATITIS B SURF AB QUANT      QUANTIFERON-TB GOLD PLUS      RUBEOLA AB, IGG      VZV AB, IGG      MEASLES/MUMPS/RUBELLA IMMUNITY      CANCELED: HEPATITIS BE AB         PLAN:  You were seen for well visit today. Continue to eat plenty of fruits and vegetables. Eat a variety of colors. Healthy eating and exercising regularly are the best things you can do for your health. Aim to exercise 150 minutes per week. Get your heart rate up and break a sweat! Your mental health is just as important as your physical health.  Consider incorporating yoga and meditation into your regular health maintenance routines. Continue to avoid smoking and tobacco and other drugs. Try to drink less than 1 alcoholic drink a day.     Carl Maurer MD

## 2020-08-19 ENCOUNTER — OFFICE VISIT (OUTPATIENT)
Dept: FAMILY MEDICINE CLINIC | Age: 45
End: 2020-08-19
Payer: COMMERCIAL

## 2020-08-19 VITALS
BODY MASS INDEX: 31.15 KG/M2 | RESPIRATION RATE: 16 BRPM | SYSTOLIC BLOOD PRESSURE: 114 MMHG | DIASTOLIC BLOOD PRESSURE: 68 MMHG | HEIGHT: 62 IN | OXYGEN SATURATION: 97 % | TEMPERATURE: 97.1 F | HEART RATE: 67 BPM | WEIGHT: 169.25 LBS

## 2020-08-19 DIAGNOSIS — Z11.9 SCREENING EXAMINATION FOR INFECTIOUS DISEASE: Primary | ICD-10-CM

## 2020-08-19 DIAGNOSIS — Z12.39 BREAST CANCER SCREENING: ICD-10-CM

## 2020-08-19 PROBLEM — D86.9 SARCOIDOSIS: Status: ACTIVE | Noted: 2017-09-22

## 2020-08-19 PROBLEM — D25.9 UTERINE LEIOMYOMA: Status: ACTIVE | Noted: 2017-08-23

## 2020-08-19 PROBLEM — N94.6 DYSMENORRHEA: Status: ACTIVE | Noted: 2017-08-23

## 2020-08-19 PROCEDURE — 99396 PREV VISIT EST AGE 40-64: CPT | Performed by: FAMILY MEDICINE

## 2020-08-19 NOTE — PROGRESS NOTES
Chief Complaint   Patient presents with    Physical     Patient presents for a college physical.      Vitals:    08/19/20 1520   BP: 114/68   BP 1 Location: Left arm   BP Patient Position: Sitting   Pulse: 67   Resp: 16   Temp: 97.1 °F (36.2 °C)   TempSrc: Temporal   SpO2: 97%   Weight: 169 lb 4 oz (76.8 kg)   Height: 5' 1.75\" (1.568 m)     1. Have you been to the ER, urgent care clinic since your last visit? Hospitalized since your last visit? No     2. Have you seen or consulted any other health care providers outside of the [address] [place] [address] since your last visit? Include any pap smears or colon screening.  No

## 2020-08-19 NOTE — PROGRESS NOTES
[address] Medicine Residency Attending Addendum:  Dr. Jd Carrera MD,  the patient and I were not physically present during this encounter. The resident and I are concurrently monitoring the patient care through appropriate telecommunication technology. I discussed the findings, assessment and plan with the resident and agree with the resident's findings and plan as documented in the resident's note.       Jose Munguia MD

## 2020-08-20 LAB
AMPHETAMINES UR QL SCN: NEGATIVE NG/ML
BARBITURATES UR QL SCN: NEGATIVE NG/ML
BENZODIAZ UR QL: NEGATIVE NG/ML
BZE UR QL: NEGATIVE NG/ML
CANNABINOIDS UR QL SCN: NEGATIVE NG/ML
HBV SURFACE AB SER-ACNC: >1000 MIU/ML
OPIATES UR QL: NEGATIVE NG/ML
PCP UR QL: NEGATIVE NG/ML

## 2020-08-22 LAB
GAMMA INTERFERON BACKGROUND BLD IA-ACNC: 0.02 IU/ML
M TB IFN-G BLD-IMP: NEGATIVE
M TB IFN-G CD4+ BCKGRND COR BLD-ACNC: 0.03 IU/ML
MEV IGG SER IA-ACNC: >300 AU/ML
MITOGEN IGNF BLD-ACNC: >10 IU/ML
MUV IGG SER IA-ACNC: >300 AU/ML
QUANTIFERON INCUBATION, QF1T: NORMAL
QUANTIFERON TB2 AG: 0.02 IU/ML
RUBV IGG SERPL IA-ACNC: 13.5 INDEX
SERVICE CMNT-IMP: NORMAL
VZV IGG SER IA-ACNC: >4000 INDEX

## 2022-01-12 ENCOUNTER — OFFICE VISIT (OUTPATIENT)
Dept: ORTHOPEDIC SURGERY | Age: 47
End: 2022-01-12
Payer: COMMERCIAL

## 2022-01-12 VITALS — BODY MASS INDEX: 31.89 KG/M2 | HEIGHT: 63 IN | WEIGHT: 180 LBS

## 2022-01-12 DIAGNOSIS — M25.569 KNEE PAIN, UNSPECIFIED CHRONICITY, UNSPECIFIED LATERALITY: Primary | ICD-10-CM

## 2022-01-12 DIAGNOSIS — S83.242S TEAR OF MEDIAL MENISCUS OF LEFT KNEE, UNSPECIFIED TEAR TYPE, UNSPECIFIED WHETHER OLD OR CURRENT TEAR, SEQUELA: ICD-10-CM

## 2022-01-12 PROCEDURE — 99204 OFFICE O/P NEW MOD 45 MIN: CPT | Performed by: ORTHOPAEDIC SURGERY

## 2022-01-12 NOTE — PROGRESS NOTES
ASSESSMENT/PLAN:  Below is the assessment and plan developed based on review of pertinent history, physical exam, labs, studies, and medications. 1. Knee pain, unspecified chronicity, unspecified laterality  -     XR KNEE LT MIN 4 V; Future  2. Tear of medial meniscus of left knee, unspecified tear type, unspecified whether old or current tear, sequela  -     MRI KNEE LT WO CONT; Future      Return for Follow-up after diagnostic test.    In discussion with the patient, we considered the numerus possible diagnoses that could be contributing to their present symptoms. We also deliberated on the extensive management options that must be considered to treat their current condition. We reviewed their accessible prior medical records, diagnostic tests, and current health and employment information. We considered how these symptoms were affecting the patient´s activities of daily living as well as employment and fitness activities. The patient had various questions regarding the possible risks, benefits, complications, morbidity and mortality regarding their diagnosis and treatment options. The patients´ comorbidities were considered, and I advocated that they consider maximizing lifestyle modification through nutrition and exercise to aid in addressing their symptoms. Shared decision making yielded an understanding to move forward with conservation treatment preferences. The patient expressed understanding that if conservative management fails to alleviate the present symptoms they will return to office for re-evaluation and consideration of additional diagnostic tests and potential surgical options.      In the interim, we have recommned ice, elevation and anti-inflammatory medications along with a physician directed home exercise program. We discussed the risks and common side effects of anti-inflamatory medications and instructed the patient to discontinue the medication and contact us if they experienced any side effects. The patient was encouraged to discuss the possible side effects with their family physician or pharmacist prior to initiating any new medications. Given that the patient's symptoms are increasing in frequency and duration, we have decided to evaluate the etiology of the pain and loss of function with an MRI. We discussed the risks of an MRI which include, but are not limited to the enclosed space, noisy environment, magnetic affect on implnated metal. We also talked about the fact that MRI is also contraindicated in the presence of internal metallic objects such as bullets or shrapnel, as well as surgical clips, pins, plates, screws, metal sutures, or wire mesh. We talked about the fact that MRI does not use radiation, but it may be contrindicated if the patient has implanted pacemakers, intracranial aneurysm clips, cochlear implants, certain prosthetic devices, implanted drug infusion pumps, neurostimulators, bone-growth stimulators, certain intrauterine contraceptive devices; or any other type of iron-based metal implants. We discussed the fact that if you are pregnant or suspect that you may be pregnant, you should notify your physician and consult with your primary care or obstetrician before having an MRI. Although rare, we talked about the fact that if contrast dye is used, there is a risk for allergic reaction to the dye. Patients who are allergic to or sensitive to medications, contrast dye, iodine, or shellfish should notify the radiologist or technologist prior to the administration of dye. MRI contrast may also have an effect on other conditions such as allergies, asthma, anemia, hypotension (low blood pressure), and sickle cell disease. The patient has expressed understadning of these risk and I will see the patient back after the MRI to discuss the findings as well as the treatment options.       SUBJECTIVE/OBJECTIVE:  Kerwin Styles (: 1975) is a 40 y.o. female, patient,here for evaluation of the Knee Pain (left knee, fell 2 months ago)  . The patient is seen today for her left knee. Unfortunately she injured her left knee when she fell down the stairs approximately 3 months ago. She reports since that time she has had swelling and pain. 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 She is taken some anti-inflammatory medications. She reports her knee feels unstable. She reports it feels like it can give way. She reports it wakes her up at night. She reports that she has had some popping and clicking as well. Physical Exam    Upon physical examination, the patient is well developed, well nourished, alert and oriented times three, with normal mood and affect and walks with an antalgic gait. Upon examination of the left knee, the patient is tender to palpation along the medial joint line, and has an effusion. They have crepitus of the patellofemoral joint with range of motion and discomfort with patella grind testing. The patient has discomfort with Zoë´s maneuvers, and the knee is stable. They lack full flexion secondary to the effusion, but have full extension. They have 5/5 strength, and are neurovascularly intact distally. There is no erythema, warmth or skin lesions present. On examination of the contralateral extremity, the patient is nontender to palpation and has excellent range of motion, stability and strength. Imagin views of the left knee show no fracture or dislocation. Allergies   Allergen Reactions    Latex, Natural Rubber Sneezing       Current Outpatient Medications   Medication Sig    levonorgestrel (MIRENA) 20 mcg/24 hr (5 years) IUD 1 Device by IntraUTERine route once. Placed Aug 2017     No current facility-administered medications for this visit.        Past Medical History:   Diagnosis Date    Lyme disease     7 years    Multiple thyroid nodules     Sarcoid     Uterine fibroid        Past Surgical History:   Procedure Laterality Date    HX HERNIA REPAIR         Family History   Problem Relation Age of Onset    Diabetes Mother     Hypertension Mother     No Known Problems Father        Social History     Socioeconomic History    Marital status: SINGLE     Spouse name: Not on file    Number of children: Not on file    Years of education: Not on file    Highest education level: Not on file   Occupational History    Not on file   Tobacco Use    Smoking status: Never Smoker    Smokeless tobacco: Never Used   Vaping Use    Vaping Use: Never used   Substance and Sexual Activity    Alcohol use: Yes    Drug use: No    Sexual activity: Yes     Partners: Male     Birth control/protection: I.U.D. Other Topics Concern    Not on file   Social History Narrative    Not on file     Social Determinants of Health     Financial Resource Strain:     Difficulty of Paying Living Expenses: Not on file   Food Insecurity:     Worried About Running Out of Food in the Last Year: Not on file    Rasheeda of Food in the Last Year: Not on file   Transportation Needs:     Lack of Transportation (Medical): Not on file    Lack of Transportation (Non-Medical):  Not on file   Physical Activity:     Days of Exercise per Week: Not on file    Minutes of Exercise per Session: Not on file   Stress:     Feeling of Stress : Not on file   Social Connections:     Frequency of Communication with Friends and Family: Not on file    Frequency of Social Gatherings with Friends and Family: Not on file    Attends Anabaptist Services: Not on file    Active Member of Clubs or Organizations: Not on file    Attends Club or Organization Meetings: Not on file    Marital Status: Not on file   Intimate Partner Violence:     Fear of Current or Ex-Partner: Not on file    Emotionally Abused: Not on file    Physically Abused: Not on file    Sexually Abused: Not on file   Housing Stability:     Unable to Pay for Housing in the Last Year: Not on file    Number of Places Lived in the Last Year: Not on file    Unstable Housing in the Last Year: Not on file       Review of Systems    No flowsheet data found. Vitals:  Ht 5' 3\" (1.6 m)   Wt 180 lb (81.6 kg)   BMI 31.89 kg/m²    Body mass index is 31.89 kg/m². An electronic signature was used to authenticate this note.   -- Riky Segundo MD

## 2022-01-30 PROBLEM — S83.242A TEAR OF MEDIAL MENISCUS OF LEFT KNEE: Status: ACTIVE | Noted: 2022-01-30

## 2022-01-30 NOTE — PROGRESS NOTES
ASSESSMENT/PLAN:  Below is the assessment and plan developed based on review of pertinent history, physical exam, labs, studies, and medications. 1. Tear of medial meniscus of left knee, unspecified tear type, unspecified whether old or current tear, sequela  -     OR DRAIN/INJECT LARGE JOINT/BURSA      Return in about 6 weeks (around 3/14/2022). In discussion with the patient, we considered the numerus possible diagnoses that could be contributing to their present symptoms. We also deliberated on the extensive management options that must be considered to treat their current condition. We reviewed their accessible prior medical records, diagnostic tests, and current health and employment information. We considered how these symptoms were affecting the patient´s activities of daily living as well as employment and fitness activities. The patient had various questions regarding the possible risks, benefits, complications, morbidity and mortality regarding their diagnosis and treatment options. The patients´ comorbidities were considered, and I advocated that they consider maximizing lifestyle modification through nutrition and exercise to aid in addressing their symptoms. Shared decision making yielded an understanding to move forward with conservation treatment preferences. The patient expressed understanding that if conservative management fails to alleviate the present symptoms they will return to office for re-evaluation and consideration of additional diagnostic tests and potential surgical options. In the interim, we have recommended ice, elevation,  and anti-inflammatory medications along with a physician directed home exercise program. We discussed the risks and common side effects of anti-inflamatory medications and instructed the patient to discontinue the medication and contact us if they experienced any side effects.  The patient was encouraged to discuss the possible side effects with their family physician or pharmacist prior to initiating any new medications. I have encouraged the patient to take an active role in their treatment by pursuing a healthy lifestyle with better nutritional choices and regular low impact exercise. We discussed that weight loss can be beneficial to relieving discomfort in the lower extremities as well as other health benefits. I have asked the patient to seek advice from their primary care physician regarding additional resources available to achieve their weight loss goals. We discussed the possibility of an injection of a steroid mixed with a local anesthetic to relieve pain and decrease inflammation in the left knee. The risks of a steroid injection which include but are not limited to cartilage damage, death of nearby bone, joint infection, nerve damage, temporary facial flushing, temporary steroid flare of pain and inflammation in the joint, temporary increase in blood sugar, tendon weakening or rupture, thinning of nearby bone (osteoporosis), thinning of skin and soft tissue around the injection site, and whitening or lightening of the skin around the injection site were reviewed at length. We specifically advised that patients with diabetes talk to their primary care to ensure they are safe for a steroid injection due to the transient increase in blood sugar associated with the injection. We discussed the chance of increased bleeding and bruising if the patient is on blood thinners or certain dietary supplements that have a blood-thinning effect. We advised patients that have an active infection, history of allergic reactions to steroids or take medications that may prohibit them from receiving a steroid injection to talk to their primary care physician before receiving and injection. We also talked about limiting the number of injections because these potential side effects increase with larger doses and repeated use.     After explaining the risks and benefits of the procedure and obtaining verbal informed consent from the patient, the proposed area for injection was confirmed with the patient. After all questions and concerns were addressed, the skin was prepped with alcohol to reduce the chances of infection. The skin was anesthetized with a topical ethylene chloride spray and the mixture of two milliliters of Depo-Medrol (40mg/ml), one milliliter of Lidocaine and one milliliter of Marcaine was injected slowly into the intra-articular space of left knee in a sterile fashion without difficulty. The needle was removed and disposed of in a sterile container. The patient tolerated the injection well and a band-aid was placed on the skin. The patient was counseled on protecting the injection area, avoiding water submersion for 2 days, icing for pain relief and looking for signs and symptoms of infection. We requested that the patient contact us if any symptoms persist greater than 48 hours after the injection. Did offer some formal physical therapy which she declined. We talked about operative and nonoperative treatment of meniscus tears. Imaging:    MRI KNEE LT WO CONT  Narrative: EXAM: MRI KNEE LT WO CONT    INDICATION: Knee pain. Meniscal tear. COMPARISON: 1/12/2022    TECHNIQUE: Axial T2 fat-saturated and proton density fat-saturated; coronal T1  and proton density fat-saturated; and sagittal T2 fat-saturated, proton density  fat-saturated, and gradient echo MRI of the left knee . CONTRAST: None. FINDINGS: Bone marrow: There is incidental red marrow conversion in the distal  femoral metadiaphysis. There is an 11 mm lobulated T2 hyperintensity in the  distal femoral metaphysis with small areas of internal regularity. Findings are  most consistent with a low-grade chondroid lesion such as an enchondroma. No  acute fracture, dislocation, or marrow replacing process. Joint fluid: Mild joint effusion.  There is a moderate Cardozo's cyst posterior  medial to the knee. Collateral ligaments and posterior, lateral corner: Mild edema is present along  the MCL. There is mild bowing of the MCL. No disruption of fibers. The LCL and  posterior lateral corner are intact. Medial meniscus: There is irregular partial tearing in the posterior root. Degenerative signal is seen in the posterior horn and body. There is slight  meniscal extrusion. Lateral meniscus: Intact. ACL and PCL: Intact. Tendons: Intact. Muscles: Within normal limits. Patellofemoral alignment: There is mild lateral tilting and subluxation of the  patella. TT-TG distance: 16 mm. Articular cartilage: Small marginal osteophytes are seen in all 3 compartments. There is a small superficial cartilage defect in the weightbearing lateral  femoral condyle. Small areas of superficial fissuring are seen in the records of  patella. Soft tissue mass: None. Impression: 1. Partial-thickness irregular tearing in the posterior root of the medial  meniscus with partial meniscal extrusion. 2. Mild bowing and edema of the MCL which may related to the above versus a  grade 1 sprain. 3. Mild osteoarthritis. 4. Mild joint effusion. ASSESSMENT/PLAN:  Below is the assessment and plan developed based on review of pertinent history, physical exam, labs, studies, and medications. 1. Knee pain, unspecified chronicity, unspecified laterality  -     XR KNEE LT MIN 4 V; Future  2. Tear of medial meniscus of left knee, unspecified tear type, unspecified whether old or current tear, sequela  -     MRI KNEE LT WO CONT; Future      Return for Follow-up after diagnostic test.    In discussion with the patient, we considered the numerus possible diagnoses that could be contributing to their present symptoms. We also deliberated on the extensive management options that must be considered to treat their current condition.  We reviewed their accessible prior medical records, diagnostic tests, and current health and employment information. We considered how these symptoms were affecting the patient´s activities of daily living as well as employment and fitness activities. The patient had various questions regarding the possible risks, benefits, complications, morbidity and mortality regarding their diagnosis and treatment options. The patients´ comorbidities were considered, and I advocated that they consider maximizing lifestyle modification through nutrition and exercise to aid in addressing their symptoms. Shared decision making yielded an understanding to move forward with conservation treatment preferences. The patient expressed understanding that if conservative management fails to alleviate the present symptoms they will return to office for re-evaluation and consideration of additional diagnostic tests and potential surgical options. In the interim, we have recommned ice, elevation and anti-inflammatory medications along with a physician directed home exercise program. We discussed the risks and common side effects of anti-inflamatory medications and instructed the patient to discontinue the medication and contact us if they experienced any side effects. The patient was encouraged to discuss the possible side effects with their family physician or pharmacist prior to initiating any new medications. Given that the patient's symptoms are increasing in frequency and duration, we have decided to evaluate the etiology of the pain and loss of function with an MRI. We discussed the risks of an MRI which include, but are not limited to the enclosed space, noisy environment, magnetic affect on implnated metal. We also talked about the fact that MRI is also contraindicated in the presence of internal metallic objects such as bullets or shrapnel, as well as surgical clips, pins, plates, screws, metal sutures, or wire mesh.  We talked about the fact that MRI does not use radiation, but it may be contrindicated if the patient has implanted pacemakers, intracranial aneurysm clips, cochlear implants, certain prosthetic devices, implanted drug infusion pumps, neurostimulators, bone-growth stimulators, certain intrauterine contraceptive devices; or any other type of iron-based metal implants. We discussed the fact that if you are pregnant or suspect that you may be pregnant, you should notify your physician and consult with your primary care or obstetrician before having an MRI. Although rare, we talked about the fact that if contrast dye is used, there is a risk for allergic reaction to the dye. Patients who are allergic to or sensitive to medications, contrast dye, iodine, or shellfish should notify the radiologist or technologist prior to the administration of dye. MRI contrast may also have an effect on other conditions such as allergies, asthma, anemia, hypotension (low blood pressure), and sickle cell disease. The patient has expressed understadning of these risk and I will see the patient back after the MRI to discuss the findings as well as the treatment options. SUBJECTIVE/OBJECTIVE:  Chris Delgado (: 1975) is a 40 y.o. female, patient,here for evaluation of the Knee Pain (left knee, fell 2 months ago)  . The patient is seen today for her left knee. Unfortunately she injured her left knee when she fell down the stairs approximately 3 months ago. She reports since that time she has had swelling and pain. 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 She is taken some anti-inflammatory medications. She reports her knee feels unstable. She reports it feels like it can give way. She reports it wakes her up at night. She reports that she has had some popping and clicking as well. Physical Exam    Upon physical examination, the patient is well developed, well nourished, alert and oriented times three, with normal mood and affect and walks with an antalgic gait.     Upon examination of the left knee, the patient is tender to palpation along the medial joint line, and has an effusion. They have crepitus of the patellofemoral joint with range of motion and discomfort with patella grind testing. The patient has discomfort with Zoë´s maneuvers, and the knee is stable. They lack full flexion secondary to the effusion, but have full extension. They have 5/5 strength, and are neurovascularly intact distally. There is no erythema, warmth or skin lesions present. On examination of the contralateral extremity, the patient is nontender to palpation and has excellent range of motion, stability and strength. Imagin views of the left knee show no fracture or dislocation. Allergies   Allergen Reactions    Latex, Natural Rubber Sneezing       Current Outpatient Medications   Medication Sig    levonorgestrel (MIRENA) 20 mcg/24 hr (5 years) IUD 1 Device by IntraUTERine route once. Placed Aug 2017     No current facility-administered medications for this visit. Past Medical History:   Diagnosis Date    Lyme disease     7 years    Multiple thyroid nodules     Sarcoid     Uterine fibroid        Past Surgical History:   Procedure Laterality Date    HX HERNIA REPAIR         Family History   Problem Relation Age of Onset    Diabetes Mother     Hypertension Mother     No Known Problems Father        Social History     Socioeconomic History    Marital status: SINGLE     Spouse name: Not on file    Number of children: Not on file    Years of education: Not on file    Highest education level: Not on file   Occupational History    Not on file   Tobacco Use    Smoking status: Never Smoker    Smokeless tobacco: Never Used   Vaping Use    Vaping Use: Never used   Substance and Sexual Activity    Alcohol use: Yes    Drug use: No    Sexual activity: Yes     Partners: Male     Birth control/protection: I.U.D.    Other Topics Concern    Not on file   Social History Narrative    Not on file     Social Determinants of Health     Financial Resource Strain:     Difficulty of Paying Living Expenses: Not on file   Food Insecurity:     Worried About Running Out of Food in the Last Year: Not on file    Rasheeda of Food in the Last Year: Not on file   Transportation Needs:     Lack of Transportation (Medical): Not on file    Lack of Transportation (Non-Medical): Not on file   Physical Activity:     Days of Exercise per Week: Not on file    Minutes of Exercise per Session: Not on file   Stress:     Feeling of Stress : Not on file   Social Connections:     Frequency of Communication with Friends and Family: Not on file    Frequency of Social Gatherings with Friends and Family: Not on file    Attends [employer] Services: Not on file    Active Member of [address] [place] [address] or Organizations: Not on file    Attends Club or Organization Meetings: Not on file    Marital Status: Not on file   Intimate Partner Violence:     Fear of Current or Ex-Partner: Not on file    Emotionally Abused: Not on file    Physically Abused: Not on file    Sexually Abused: Not on file   Housing Stability:     Unable to Pay for Housing in the Last Year: Not on file    Number of [place] in the Last Year: Not on file    Unstable Housing in the Last Year: Not on file       Review of Systems    No flowsheet data found. Vitals:  Ht 5' 3\" (1.6 m)   Wt 180 lb (81.6 kg)   BMI 31.89 kg/m²    Body mass index is 31.89 kg/m². An electronic signature was used to authenticate this note.   -- Georgi Fuentes MD   .

## 2022-01-31 ENCOUNTER — OFFICE VISIT (OUTPATIENT)
Dept: ORTHOPEDIC SURGERY | Age: 47
End: 2022-01-31
Payer: COMMERCIAL

## 2022-01-31 VITALS — BODY MASS INDEX: 31.89 KG/M2 | WEIGHT: 180 LBS | HEIGHT: 63 IN

## 2022-01-31 DIAGNOSIS — S83.242S TEAR OF MEDIAL MENISCUS OF LEFT KNEE, UNSPECIFIED TEAR TYPE, UNSPECIFIED WHETHER OLD OR CURRENT TEAR, SEQUELA: Primary | ICD-10-CM

## 2022-01-31 PROCEDURE — 20610 DRAIN/INJ JOINT/BURSA W/O US: CPT | Performed by: ORTHOPAEDIC SURGERY

## 2022-01-31 RX ORDER — METHYLPREDNISOLONE ACETATE 40 MG/ML
80 INJECTION, SUSPENSION INTRA-ARTICULAR; INTRALESIONAL; INTRAMUSCULAR; SOFT TISSUE ONCE
Status: COMPLETED | OUTPATIENT
Start: 2022-01-31 | End: 2022-01-31

## 2022-01-31 RX ORDER — LIDOCAINE HYDROCHLORIDE 10 MG/ML
1 INJECTION INFILTRATION; PERINEURAL ONCE
Status: COMPLETED | OUTPATIENT
Start: 2022-01-31 | End: 2022-01-31

## 2022-01-31 RX ORDER — BUPIVACAINE HYDROCHLORIDE 2.5 MG/ML
1 INJECTION, SOLUTION INFILTRATION; PERINEURAL ONCE
Status: COMPLETED | OUTPATIENT
Start: 2022-01-31 | End: 2022-01-31

## 2022-01-31 RX ADMIN — METHYLPREDNISOLONE ACETATE 80 MG: 40 INJECTION, SUSPENSION INTRA-ARTICULAR; INTRALESIONAL; INTRAMUSCULAR; SOFT TISSUE at 09:07

## 2022-01-31 RX ADMIN — BUPIVACAINE HYDROCHLORIDE 2.5 MG: 2.5 INJECTION, SOLUTION INFILTRATION; PERINEURAL at 09:06

## 2022-01-31 RX ADMIN — LIDOCAINE HYDROCHLORIDE 1 ML: 10 INJECTION INFILTRATION; PERINEURAL at 09:07

## 2022-01-31 NOTE — LETTER
NOTIFICATION RETURN TO WORK / SCHOOL    01/31/2022     Ms. Maximilian Cheney [address] [facility] [id number]      To Whom It May Concern:    [place] is currently under the care of [facility]. She will return to work/school on: 02/02/2022    If there are questions or concerns please have the patient contact our office.         Sincerely,            Karla Matias MD

## 2022-03-18 PROBLEM — D86.9 SARCOIDOSIS: Status: ACTIVE | Noted: 2017-09-22

## 2022-03-19 PROBLEM — S83.242A TEAR OF MEDIAL MENISCUS OF LEFT KNEE: Status: ACTIVE | Noted: 2022-01-30

## 2022-03-19 PROBLEM — D25.9 UTERINE LEIOMYOMA: Status: ACTIVE | Noted: 2017-08-23

## 2022-03-20 PROBLEM — N94.6 DYSMENORRHEA: Status: ACTIVE | Noted: 2017-08-23

## 2022-04-05 ENCOUNTER — NURSE TRIAGE (OUTPATIENT)
Dept: OTHER | Facility: CLINIC | Age: 47
End: 2022-04-05

## 2022-04-05 NOTE — TELEPHONE ENCOUNTER
Received call from Juanita at [facility] with Red Flag Complaint. Subjective: Caller states \"I have been having some dizziness and nausea . \"    Current Symptoms:   +\"my balance is off\"  +\"seems like the room is spinning \"  +able to walk without assistance - \"it's not all the time\"   -has had dizziness in the past \"but not the nausea\" - I had an MRI -sarcoidosis had spread - per pt   -denies chest pain, shortness of breath, vomiting , diarrhea or bleeding   +nausea that comes and goes   +HR per pulse O2 78    Onset: 5 days ago; unchanged    Associated Symptoms: constipation-loss of appetite     Pain Severity: Denies     Temperature: Denies     What has been tried: Aleve     LMP: 3/28/2022-\"for 1 day\"- IUD  Pregnant: No    Recommended disposition: See PCP within 3 Days    Care advice provided, patient verbalizes understanding; denies any other questions or concerns; instructed to call back for any new or worsening symptoms. Patient/Caller agrees with recommended disposition; writer [facility] for appointment scheduling    Attention Provider: Thank you for allowing me to participate in the care of your patient. The patient was connected to triage in response to information provided to the ECC. Please do not respond through this encounter as the response is not directed to a shared pool.       Reason for Disposition   MILD dizziness (e.g., walking normally) AND has NOT been evaluated by physician for this (Exception: dizziness caused by heat exposure, sudden standing, or poor fluid intake)    Protocols used: DIZZINESS-ADULT-OH

## 2022-06-27 NOTE — PROGRESS NOTES
Crystal Delgado is a 40 y.o. female who presents to clinic today for annual physical exam/school physical.     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 She needs lab work (TB testing and UDS) and proof of immunity to rubeola, rubella, mumps, varicella, Hep B prior to starting school. Patient is followed by [address] [place] [address] [facility] for GYN care. Last Pap was last year and was normal. Last mammogram was in 2017 or 2018 and was normal. She has had mammograms ordered since then but has not scheduled one. Gyn History  [id number] - 4 kids, 2 miscarriages, all living children were born full term    No LMP recorded. Has IUD in place so not currently having periods. Sexually active?: Yes, 1 male partner  Method of family planning: IUD    Any family history of gyn cancers (breast, ovarian or cervical ca)? Breast cancer in paternal grandmother     Preventative care:  Alcohol abuse screening (CAGE): negative    Depression screening: negative    HIV: negative in the past   Hep C: declined   PAP Smear: reportedly last done in 2021 and was normal - gets with GYN   HLD: collecting lipid panel today   Diabetes screening: collecting A1c today    Breast cancer screening: last mammogram reportedly in 2017 or 2018 and was normal, ordered by GYN  Colon cancer screening: N/A   Tdap: done in 2014   COVID vaccine: has had 2      Past Medical History  Past Medical History:   Diagnosis Date    Acute medial meniscus tear of left knee     Lyme disease     7 years    Multiple thyroid nodules     Osteoarthritis, knee     Sarcoid     Uterine fibroid        Current Medications   Current Outpatient Medications on File Prior to Visit   Medication Sig Dispense Refill    levonorgestrel (MIRENA) 20 mcg/24 hr (5 years) IUD 1 Device by IntraUTERine route once.  Placed Aug 2017       No current facility-administered medications on file prior to visit.       Surgical History  Past Surgical History:   Procedure Laterality Date    HX HERNIA REPAIR         Family History   Family History   Problem Relation Age of Onset    Diabetes Mother     Hypertension Mother     No Known Problems Father     Thyroid Disease Other     Breast Cancer Paternal Grandmother        Social History  Social History     Socioeconomic History    Marital status: SINGLE     Spouse name: Not on file    Number of children: Not on file    Years of education: Not on file    Highest education level: Not on file   Occupational History    Not on file   Tobacco Use    Smoking status: Never Smoker    Smokeless tobacco: Never Used   Vaping Use    Vaping Use: Never used   Substance and Sexual Activity    Alcohol use: Yes     Comment: socially    Drug use: No    Sexual activity: Yes     Partners: Male     Birth control/protection: I.U.D. Other Topics Concern    Not on file   Social History Narrative    Not on file     Social Determinants of Health     Financial Resource Strain: Low Risk     Difficulty of Paying Living Expenses: Not hard at all   Food Insecurity: No Food Insecurity    Worried About Running Out of Food in the Last Year: Never true    [address] in the Last Year: Never true   Transportation Needs:     Lack of Transportation (Medical): Not on file    Lack of Transportation (Non-Medical):  Not on file   Physical Activity:     Days of Exercise per Week: Not on file    Minutes of Exercise per Session: Not on file   Stress:     Feeling of Stress : Not on file   Social Connections:     Frequency of Communication with Friends and Family: Not on file    Frequency of Social Gatherings with Friends and Family: Not on file    Attends Restorationist Services: Not on file    Active Member of Clubs or Organizations: Not on file    Attends Club or Organization Meetings: Not on file    Marital Status: Not on file   Intimate Partner Violence:     Fear of Current or Ex-Partner: Not on file    Emotionally Abused: Not on file    Physically Abused: Not on file    Sexually Abused: Not on file   Housing Stability:     Unable to Pay for Housing in the Last Year: Not on file    Number of Places Lived in the Last Year: Not on file    Unstable Housing in the Last Year: Not on file       Objective   Vital Signs  Visit Vitals  /78 (BP 1 Location: Right arm, BP Patient Position: Sitting, BP Cuff Size: Large adult)   Pulse 76   Temp 97 °F (36.1 °C) (Temporal)   Resp 18   Ht 5' 3\" (1.6 m)   Wt 173 lb (78.5 kg)   SpO2 98%   BMI 30.65 kg/m²       PHYSICAL EXAM   GEN: No apparent distress. Alert and oriented and responds to all questions appropriately. EYES:  Conjunctiva clear; pupils round and reactive to light; extraocular movements are intact. EAR: External ears are normal.  Tympanic membranes are clear and without effusion. NOSE: Turbinates are within normal limits. No drainage  OROPHYARYNX: No oral lesions or exudates. NECK:  Supple; no masses; thyroid normal           LUNGS: Respirations unlabored; clear to auscultation bilaterally  CARDIOVASCULAR: Regular, rate, and rhythm without murmurs, gallops or rubs   ABDOMEN: Soft; nontender; nondistended; normoactive bowel sounds; no masses or organomegaly  PELVIC: deferred   NEUROLOGIC: No focal neurologic deficits. Strength and sensation grossly intact. Coordination and gait grossly intact. EXT: Well perfused. No edema. SKIN: No obvious rashes    Assessment and Plan   Peter Lara is a 40 y.o. female presenting to clinic today for routine annual exam/school physical.      ICD-10-CM ICD-9-CM    1. Well woman exam (no gynecological exam)  Z00.00 V70.0 QUANTIFERON-TB GOLD PLUS      DRUG SCREEN, URINE      METABOLIC PANEL, BASIC      LIPID PANEL      LIPID PANEL      METABOLIC PANEL, BASIC      7-DRUG SCREEN UNBUND, UR      7-DRUG SCREEN UNBUND, UR      CANCELED: DRUG SCREEN, URINE   2.  History of thyroid nodule  Z86.39 V12.29 TSH 3RD GENERATION      TSH 3RD GENERATION   3. Obesity (BMI 30.0-34. 9)  I76.7 479.44 METABOLIC PANEL, BASIC      LIPID PANEL      HEMOGLOBIN A1C WITH EAG      HEMOGLOBIN A1C WITH EAG      LIPID PANEL      METABOLIC PANEL, BASIC   4. Family history of diabetes mellitus  K64.5 E45.0 METABOLIC PANEL, BASIC      HEMOGLOBIN A1C WITH EAG      HEMOGLOBIN A1C WITH EAG      METABOLIC PANEL, BASIC   5. Encounter for drug screening  Z02.83 V72.85 DRUG SCREEN, URINE      7-DRUG SCREEN UNBUND, UR      7-DRUG SCREEN UNBUND, UR      CANCELED: DRUG SCREEN, URINE   6. Screening for tuberculosis  Z11.1 V74.1 QUANTIFERON-TB GOLD PLUS     - Patient has copy of prior labs showing immunity to rubeola, rubella, mumps, varicella, Hep B as required by her school   - Collecting quantiferon gold today for TB screening   - Collecting UDS today as required by her school   - Obesity, family hx of DM: F/U BMP, lipid panel, A1c  - Hx of thyroid nodules: previously followed by endocrinology and had thyroid US that showed benign appearing thyroid nodules. Collecting TSH today  - Counseled re: diet, exercise, healthy lifestyle  - Patient will schedule follow up with GYN for annual gynecological exam, screening mammogram, and Pap smear  - Return for yearly wellness visits    I discussed the aforementioned diagnoses with the patient as well as the plan of care. All questions were answered and an AVS was provided.      I discussed this patient with Dr. Gabriel Ortega (Attending Physician)      Signed By:  Flora Rothman DO  Family Medicine Resident

## 2022-06-28 ENCOUNTER — OFFICE VISIT (OUTPATIENT)
Dept: FAMILY MEDICINE CLINIC | Age: 47
End: 2022-06-28
Payer: COMMERCIAL

## 2022-06-28 VITALS
HEART RATE: 76 BPM | SYSTOLIC BLOOD PRESSURE: 124 MMHG | DIASTOLIC BLOOD PRESSURE: 78 MMHG | BODY MASS INDEX: 30.65 KG/M2 | OXYGEN SATURATION: 98 % | TEMPERATURE: 97 F | HEIGHT: 63 IN | WEIGHT: 173 LBS | RESPIRATION RATE: 18 BRPM

## 2022-06-28 DIAGNOSIS — Z83.3 FAMILY HISTORY OF DIABETES MELLITUS: ICD-10-CM

## 2022-06-28 DIAGNOSIS — Z00.00 WELL WOMAN EXAM (NO GYNECOLOGICAL EXAM): Primary | ICD-10-CM

## 2022-06-28 DIAGNOSIS — Z02.83 ENCOUNTER FOR DRUG SCREENING: ICD-10-CM

## 2022-06-28 DIAGNOSIS — Z86.39 HISTORY OF THYROID NODULE: ICD-10-CM

## 2022-06-28 DIAGNOSIS — Z11.1 SCREENING FOR TUBERCULOSIS: ICD-10-CM

## 2022-06-28 DIAGNOSIS — E66.9 OBESITY (BMI 30.0-34.9): ICD-10-CM

## 2022-06-28 PROCEDURE — 99396 PREV VISIT EST AGE 40-64: CPT | Performed by: STUDENT IN AN ORGANIZED HEALTH CARE EDUCATION/TRAINING PROGRAM

## 2022-06-28 NOTE — PATIENT INSTRUCTIONS
Well Visit, Ages 25 to 48: Care Instructions  Overview     Well visits can help you stay healthy. Your doctor has checked your overall health and may have suggested ways to take good care of yourself. Your doctor also may have recommended tests. At home, you can help prevent illness with healthy eating, regular exercise, and other steps. Follow-up care is a key part of your treatment and safety. Be sure to make and go to all appointments, and call your doctor if you are having problems. It's also a good idea to know your test results and keep a list of the medicines you take. How can you care for yourself at home? · Get screening tests that you and your doctor decide on. Screening helps find diseases before any symptoms appear. · Eat healthy foods. Choose fruits, vegetables, whole grains, protein, and low-fat dairy foods. Limit fat, especially saturated fat. Reduce salt in your diet. · Limit alcohol. If you are a man, have no more than 2 drinks a day or 14 drinks a week. If you are a woman, have no more than 1 drink a day or 7 drinks a week. · Get at least 30 minutes of physical activity on most days of the week. Walking is a good choice. You also may want to do other activities, such as running, swimming, cycling, or playing tennis or team sports. Discuss any changes in your exercise program with your doctor. · Reach and stay at a healthy weight. This will lower your risk for many problems, such as obesity, diabetes, heart disease, and high blood pressure. · Do not smoke or allow others to smoke around you. If you need help quitting, talk to your doctor about stop-smoking programs and medicines. These can increase your chances of quitting for good. · Care for your mental health. It is easy to get weighed down by worry and stress. Learn strategies to manage stress, like deep breathing and mindfulness, and stay connected with your family and community.  If you find you often feel sad or hopeless, talk with your doctor. Treatment can help. · Talk to your doctor about whether you have any risk factors for sexually transmitted infections (STIs). You can help prevent STIs if you wait to have sex with a new partner (or partners) until you've each been tested for STIs. It also helps if you use condoms (male or female condoms) and if you limit your sex partners to one person who only has sex with you. Vaccines are available for some STIs, such as HPV. · Use birth control if it's important to you to prevent pregnancy. Talk with your doctor about the choices available and what might be best for you. · If you think you may have a problem with alcohol or drug use, talk to your doctor. This includes prescription medicines (such as amphetamines and opioids) and illegal drugs (such as cocaine and methamphetamine). Your doctor can help you figure out what type of treatment is best for you. · Protect your skin from too much sun. When you're outdoors from 10 a.m. to 4 p.m., stay in the shade or cover up with clothing and a hat with a wide brim. Wear sunglasses that block UV rays. Even when it's cloudy, put broad-spectrum sunscreen (SPF 30 or higher) on any exposed skin. · See a dentist one or two times a year for checkups and to have your teeth cleaned. · Wear a seat belt in the car. When should you call for help? Watch closely for changes in your health, and be sure to contact your doctor if you have any problems or symptoms that concern you. Where can you learn more? Go to http://www.[employer].com/  Enter P072 in the search box to learn more about \"Well Visit, Ages 25 to 48: Care Instructions. \"  Current as of: October 6, 2021               Content Version: 13.2  © 0066-4264 Healthwise, [place]. Care instructions adapted under license by [facility] (which disclaims liability or warranty for this information).  If you have questions about a medical condition or this instruction, always ask your healthcare professional. [place] [address] any warranty or liability for your use of this information.

## 2022-06-28 NOTE — PROGRESS NOTES
Identified Patient with two Patient identifiers (Name and ). Two Patient Identifiers confirmed. Reviewed record in preparation for visit and have obtained necessary documentation. Chief Complaint   Patient presents with    School/Camp Physical       Visit Vitals  /78 (BP 1 Location: Right arm, BP Patient Position: Sitting, BP Cuff Size: Large adult)   Pulse 76   Temp 97 °F (36.1 °C) (Temporal)   Resp 18   Ht 5' 3\" (1.6 m)   Wt 173 lb (78.5 kg)   SpO2 98%   BMI 30.65 kg/m²       1. Have you been to the ER, urgent care clinic since your last visit? Hospitalized since your last visit? No    2. Have you seen or consulted any other health care providers outside of the [address] [place] [address] since your last visit? Include any pap smears or colon screening.  No

## 2022-06-28 NOTE — LETTER
Name: Joann Tejdaa   Sex: female   : 1975   [phone] [facility] [address] [facility]  [phone] (home)     Current Immunizations:  Immunization History   Administered Date(s) Administered    COVID-19, Pfizer Purple top, DILUTE for use, 12+ yrs, 30mcg/0.3mL dose 08/15/2021, 2021    Influenza Vaccine 10/26/2018    Influenza Vaccine (Quad) Mdck Pf (>2 Yrs Flucelvax QUAD 05728) 2019    Tdap 2014       Allergies:   Allergies as of 2022 - Fully Reviewed 2022   Allergen Reaction Noted    Latex, natural rubber Sneezing 2018

## 2022-06-29 LAB
AMPHETAMINES UR QL SCN: NEGATIVE NG/ML
BARBITURATES UR QL SCN: NEGATIVE NG/ML
BENZODIAZ UR QL: NEGATIVE NG/ML
BUN SERPL-MCNC: 10 MG/DL (ref 6–24)
BUN/CREAT SERPL: 13 (ref 9–23)
BZE UR QL: NEGATIVE NG/ML
CALCIUM SERPL-MCNC: 10.2 MG/DL (ref 8.7–10.2)
CANNABINOIDS UR QL SCN: NEGATIVE NG/ML
CHLORIDE SERPL-SCNC: 103 MMOL/L (ref 96–106)
CHOLEST SERPL-MCNC: 182 MG/DL (ref 100–199)
CO2 SERPL-SCNC: 24 MMOL/L (ref 20–29)
CREAT SERPL-MCNC: 0.79 MG/DL (ref 0.57–1)
EGFR: 94 ML/MIN/1.73
EST. AVERAGE GLUCOSE BLD GHB EST-MCNC: 120 MG/DL
GLUCOSE SERPL-MCNC: 76 MG/DL (ref 65–99)
HBA1C MFR BLD: 5.8 % (ref 4.8–5.6)
HDLC SERPL-MCNC: 42 MG/DL
IMP & REVIEW OF LAB RESULTS: NORMAL
LDLC SERPL CALC-MCNC: 123 MG/DL (ref 0–99)
OPIATES UR QL: NEGATIVE NG/ML
PCP UR QL: NEGATIVE NG/ML
POTASSIUM SERPL-SCNC: 4.3 MMOL/L (ref 3.5–5.2)
SODIUM SERPL-SCNC: 141 MMOL/L (ref 134–144)
TRIGL SERPL-MCNC: 91 MG/DL (ref 0–149)
TSH SERPL DL<=0.005 MIU/L-ACNC: 1.33 UIU/ML (ref 0.45–4.5)
VLDLC SERPL CALC-MCNC: 17 MG/DL (ref 5–40)

## 2022-06-30 NOTE — PROGRESS NOTES
Screening UDS (for hiring at new job) negative. TSH and BMP wnl. A1c within prediabetes range at 5.8%. Lipid panel with elevated LDL but 10-year ASCVD risk is low at 1.4% so statin is not indicated at this time. Will recommend lifestyle changes.

## 2022-07-03 LAB
GAMMA INTERFERON BACKGROUND BLD IA-ACNC: 0.05 IU/ML
M TB IFN-G BLD-IMP: NEGATIVE
M TB IFN-G CD4+ BCKGRND COR BLD-ACNC: 0.05 IU/ML
MITOGEN IGNF BLD-ACNC: >10 IU/ML
QUANTIFERON INCUBATION, QF1T: NORMAL
QUANTIFERON TB2 AG: 0.05 IU/ML
SERVICE CMNT-IMP: NORMAL

## 2022-10-06 ENCOUNTER — TRANSCRIBE ORDER (OUTPATIENT)
Dept: SCHEDULING | Age: 47
End: 2022-10-06

## 2022-10-06 DIAGNOSIS — D50.0 ANEMIA DUE TO BLOOD LOSS: ICD-10-CM

## 2022-10-06 DIAGNOSIS — N93.0 POSTCOITAL BLEEDING: ICD-10-CM

## 2022-10-06 DIAGNOSIS — D25.9 UTERINE FIBROID: Primary | ICD-10-CM

## 2022-10-06 DIAGNOSIS — R10.84 ABDOMINAL PAIN, GENERALIZED: ICD-10-CM

## 2022-10-19 ENCOUNTER — HOSPITAL ENCOUNTER (OUTPATIENT)
Dept: MRI IMAGING | Age: 47
Discharge: HOME | End: 2022-10-19
Attending: NURSE PRACTITIONER
Payer: COMMERCIAL

## 2022-10-19 VITALS — HEIGHT: 63 IN | WEIGHT: 180 LBS | BODY MASS INDEX: 31.89 KG/M2

## 2022-10-19 DIAGNOSIS — D25.9 UTERINE FIBROID: ICD-10-CM

## 2022-10-19 DIAGNOSIS — N93.0 POSTCOITAL BLEEDING: ICD-10-CM

## 2022-10-19 DIAGNOSIS — R10.84 ABDOMINAL PAIN, GENERALIZED: ICD-10-CM

## 2022-10-19 DIAGNOSIS — D50.0 ANEMIA DUE TO BLOOD LOSS: ICD-10-CM

## 2022-10-19 PROCEDURE — 74011250636 HC RX REV CODE- 250/636: Performed by: RADIOLOGY

## 2022-10-19 PROCEDURE — 72197 MRI PELVIS W/O & W/DYE: CPT

## 2022-10-19 PROCEDURE — A9576 INJ PROHANCE MULTIPACK: HCPCS | Performed by: RADIOLOGY

## 2022-10-19 RX ADMIN — GADOTERIDOL 16 ML: 279.3 INJECTION, SOLUTION INTRAVENOUS at 11:35

## 2022-12-05 ENCOUNTER — OFFICE VISIT (OUTPATIENT)
Dept: FAMILY MEDICINE CLINIC | Age: 47
End: 2022-12-05

## 2022-12-05 VITALS
RESPIRATION RATE: 16 BRPM | BODY MASS INDEX: 30.83 KG/M2 | DIASTOLIC BLOOD PRESSURE: 84 MMHG | OXYGEN SATURATION: 97 % | HEIGHT: 63 IN | TEMPERATURE: 97.8 F | WEIGHT: 174 LBS | SYSTOLIC BLOOD PRESSURE: 124 MMHG | HEART RATE: 73 BPM

## 2022-12-05 DIAGNOSIS — G56.01 CARPAL TUNNEL SYNDROME OF RIGHT WRIST: ICD-10-CM

## 2022-12-05 DIAGNOSIS — D86.89 SARCOIDOSIS OF CENTRAL NERVOUS SYSTEM: Primary | ICD-10-CM

## 2022-12-05 RX ORDER — NORTRIPTYLINE HYDROCHLORIDE 10 MG/1
40 CAPSULE ORAL
COMMUNITY

## 2022-12-05 RX ORDER — PREDNISONE 20 MG/1
20 TABLET ORAL 2 TIMES DAILY
Qty: 28 TABLET | Refills: 0 | Status: SHIPPED | OUTPATIENT
Start: 2022-12-05 | End: 2022-12-19

## 2022-12-05 NOTE — PROGRESS NOTES
[place] [address] [facility]      Chief Complaint:     Chief Complaint   Patient presents with    Numbness     Reports brain fog and right sided facial numbness that ha been represent since Thursday of last week (December 1, 2022)       Dana Mcfarland is a 56 y.o. female that presents for: Brain fog, right-sided tingling      Assessment/Plan:   I personally reviewed the following Pertinent Labs/Studies:   - Encounter notes, labs, and imaging from 1/22/20, 2/27/20, 3/12/20, and 6/28/22. Pt is a 56year old female with known sarcoidosis (pulmonary and neurologic subtypes) who presents with recurrent symptoms of active neurosarcoidosis including right sided facial and UE paraesthesias, and what she is describing as [place] fog\". On exam, no signs or symptoms of focal motor weakness, reflexes are intact bilaterally, and pt does not have objective tactile numbness however does report decreased sensation in the right V2 and V3 nerve distribution and along the lateral right upper extremity. Diagnoses and all orders for this visit:    1. 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 - Refer to original encounter on 1/22/20. During that time, pt was worked up for thyroid disease, lyme disease, hyperlipidemia, acute inflammatory disease, and had routine labs performed. Lyme titers, TSH, ESR, CRP, lipids, CBC, and CMP were all within normal limits.   - Brain MRI revealed \"abnormal leptomeningeal enhancement involving the V3 division of the right trigeminal nerve and suspected abnormal leptomeningeal enhancement involving the V2 division. .. representing an infectious or inflammatory/granulomatous process such as neurosarcoidosis\". Follow-up MRI of the face and neck was c/w these findings. - Subsequent ID work up screened for MMRV, TB, and Rubeola, and this was negative.    - Neurology evaluated the patient and agreed that neurosarcoid was most likely however wanted to get an LP for confirmation/definitive diagnosis. However pt failed to follow up with them as symptoms had resolved with steroid therapy and she lost her insurance. - Given high suspicion for recurrent neurosarcoidosis flare, will treat as such with oral prednisone, 0.5 mg/kg body weight for a 2 week course. Will repeat MRI to assess for active inflammation and refer patient back to neuro for further workup and treatment. - Pt given strict CVA precautions and told to go to ED should they arise or her symptoms worsen. She will follow up in 1 week to reassess for symptomatic improvement and eval for further workup should it be indicated. -     predniSONE (DELTASONE) 20 mg tablet; Take 1 Tablet by mouth two (2) times a day for 14 days.  -     REFERRAL TO NEUROLOGY  -     MRI BRAIN W WO CONT; Future    2. Carpal tunnel syndrome of right wrist: pt with known carpal tunnel syndrome of right wrist, prior EMG showing median nerve involvement. On exam, Tinel's and Phalen's signs are positive with negative Finkelstein's maneuver. - Patient advised to begin night-time wrist splinting. Would expect symptomatic improvement with steroid course but pt advised to begin NSAID therapy as needed following steroids course for inflammation. Would consider physical therapy. Persistent symptoms may require median nerve sheath steroid injections and would consider ortho consult for definitive therapy with surgical release of the carpal tunnel. Of note, pt is scheduled to have a uterine artery embolization for uterine fibroid therapy in 10 days. Although concurrent steroid use is unlikely to disqualify hr for surgery she was advised to reach out to her OB to confirm that this would be safe. Follow up: Follow-up and Dispositions    Return in about 1 week (around 12/12/2022) for follow up on symptoms of weakness.  .          Subjective:   HPI:  Nicolás Nickerson is a 56 y.o. female that presents for:    1 week of symptoms of foggy-headedness, right-sided facial tingling, and right arm and hand tingling. She also reports mild weakness in the right hand. Lastly she feels there is some weakness in the right side of her face. She denies any loss of vision, speech difficulty, seizures, LOC, headaches, dizziness, profound muscular weakness, or christ numbness. She also denies recent fever, chills, weight loss, or night sweats. Health Maintenance:  Health Maintenance Due   Topic Date Due    Hepatitis C Screening  Never done    Cervical cancer screen  Never done    Colorectal Cancer Screening Combo  Never done    COVID-19 Vaccine (3 - Booster for Pfizer series) 11/02/2021    Flu Vaccine (1) 08/01/2022        Review of Systems   Constitutional:  Negative for chills, diaphoresis, fever, malaise/fatigue and weight loss. HENT:  Negative for hearing loss and tinnitus. Eyes:  Negative for blurred vision, double vision and photophobia. Respiratory:  Negative for shortness of breath and wheezing. Cardiovascular:  Negative for chest pain and palpitations. Gastrointestinal:  Negative for nausea and vomiting. Musculoskeletal:  Negative for joint pain and myalgias. Neurological:  Positive for tingling, sensory change, focal weakness and weakness. Negative for dizziness, tremors, speech change, seizures, loss of consciousness and headaches. Past medical history, social history, and medications personally reviewed. Past Medical History:   Diagnosis Date    Acute medial meniscus tear of left knee     Lyme disease     7 years    Multiple thyroid nodules     Osteoarthritis, knee     Sarcoid     Uterine fibroid         Allergies personally reviewed. Allergies   Allergen Reactions    Latex, Natural Rubber Sneezing          Objective:   Vitals reviewed.   Visit Vitals  /84   Pulse 73   Temp 97.8 °F (36.6 °C) (Temporal)   Resp 16   Ht 5' 3\" (1.6 m)   Wt 174 lb (78.9 kg)   SpO2 97%   BMI 30.82 kg/m²        Wt Readings from Last 3 Encounters:   12/05/22 174 lb (78.9 kg)   10/19/22 180 lb (81.6 kg)   06/28/22 173 lb (78.5 kg)        Physical Exam  Constitutional:       General: She is not in acute distress. Appearance: Normal appearance. HENT:      Mouth/Throat:      Mouth: Mucous membranes are moist.      Pharynx: Oropharynx is clear. Eyes:      General: No visual field deficit. Extraocular Movements: Extraocular movements intact. Conjunctiva/sclera: Conjunctivae normal.      Pupils: Pupils are equal, round, and reactive to light. Cardiovascular:      Rate and Rhythm: Normal rate and regular rhythm. Pulses: Normal pulses. Heart sounds: Normal heart sounds. Pulmonary:      Effort: Pulmonary effort is normal.      Breath sounds: Normal breath sounds. Musculoskeletal:      Cervical back: Normal range of motion. No tenderness. Right lower leg: No edema. Left lower leg: No edema. Skin:     General: Skin is warm and dry. Capillary Refill: Capillary refill takes less than 2 seconds. Neurological:      Mental Status: She is alert and oriented to person, place, and time. Cranial Nerves: Cranial nerve deficit present. No dysarthria or facial asymmetry. Sensory: Sensory deficit present. Motor: Weakness present. No tremor, atrophy, abnormal muscle tone, seizure activity or pronator drift. Coordination: Coordination is intact. Gait: Gait is intact. Deep Tendon Reflexes:      Reflex Scores:       Tricep reflexes are 1+ on the right side and 1+ on the left side. Bicep reflexes are 2+ on the right side and 2+ on the left side. Brachioradialis reflexes are 2+ on the right side and 2+ on the left side. Patellar reflexes are 2+ on the right side and 2+ on the left side. Achilles reflexes are 1+ on the right side and 1+ on the left side. Comments: Mild sensory deficits in the V2-V3 distribution.  Otherwise unremarkable CN exam. Some weakness with thumb opposition in the right hand and there is tingling on wrist palmar flexion in the median nerve distribution. Psychiatric:         Mood and Affect: Mood normal.         Behavior: Behavior normal.          I have reviewed pertinent labs results and other data. I have discussed the diagnosis with the patient and the intended plan as seen in the above orders. The patient has received an after-visit summary and questions were answered concerning future plans. I have discussed medication side effects and warnings with the patient as well.     Pt discussed with Dr. Adela Hedrick (attending physician)    Jud Salinas MD  12/05/22

## 2022-12-05 NOTE — LETTER
NOTIFICATION RETURN TO WORK / SCHOOL    12/5/2022 5:33 PM    Ms. Andria Lazaro  [address] [address]      To Whom It May Concern:    Andria Lazaro is currently under the care of [facility]. She will return to work/school on: 12/7/22    If there are questions or concerns please have the patient contact our office.         Sincerely,      Jason Zavaleta MD

## 2022-12-09 ENCOUNTER — HOSPITAL ENCOUNTER (OUTPATIENT)
Dept: MRI IMAGING | Age: 47
End: 2022-12-09
Attending: STUDENT IN AN ORGANIZED HEALTH CARE EDUCATION/TRAINING PROGRAM
Payer: COMMERCIAL

## 2022-12-09 VITALS — BODY MASS INDEX: 31 KG/M2 | WEIGHT: 175 LBS

## 2022-12-09 DIAGNOSIS — D86.89 SARCOIDOSIS OF CENTRAL NERVOUS SYSTEM: ICD-10-CM

## 2022-12-09 PROCEDURE — A9576 INJ PROHANCE MULTIPACK: HCPCS | Performed by: RADIOLOGY

## 2022-12-09 PROCEDURE — 70553 MRI BRAIN STEM W/O & W/DYE: CPT

## 2022-12-09 PROCEDURE — 74011250636 HC RX REV CODE- 250/636: Performed by: RADIOLOGY

## 2022-12-09 RX ADMIN — GADOTERIDOL 15 ML: 279.3 INJECTION, SOLUTION INTRAVENOUS at 18:47

## 2022-12-13 DIAGNOSIS — K11.8 NODULE OF PAROTID GLAND: Primary | ICD-10-CM

## 2022-12-13 NOTE — PROGRESS NOTES
Apparent resolution/improvement of trigeminal nerve enhancement. Dedicated nerve sequences for follow up recommended if symptoms persistent but called patient to check in and she says she feels like she is returning to normal since starting the steroids. There were bilateral enhancing parotid gland nodules on MRI and these were not able to be completely evaluated, so will order a contrast neck CT for characterization/confirmation.

## 2022-12-19 ENCOUNTER — VIRTUAL VISIT (OUTPATIENT)
Dept: FAMILY MEDICINE CLINIC | Age: 47
End: 2022-12-19
Payer: COMMERCIAL

## 2022-12-19 DIAGNOSIS — D86.89 SARCOIDOSIS OF CENTRAL NERVOUS SYSTEM: Primary | ICD-10-CM

## 2022-12-19 DIAGNOSIS — K11.8 NODULE OF PAROTID GLAND: ICD-10-CM

## 2022-12-19 PROCEDURE — 99213 OFFICE O/P EST LOW 20 MIN: CPT | Performed by: FAMILY MEDICINE

## 2022-12-19 NOTE — PROGRESS NOTES
Micheal Figueredo  56 y.o. female  1975  [address]  [id number]   Arline Galan MD       Encounter Date and Time: December 19, 2022 at 9:07 AM    Micheal Figueredo, was evaluated through a synchronous (real-time) audio-video encounter. The patient (or guardian if applicable) is aware that this is a billable service, which includes applicable co-pays. This Virtual Visit was conducted with patient's (and/or legal guardian's) consent. The visit was conducted pursuant to the emergency declaration under the [facility], [address] [place] [address] authority and the [employer] and [employer] Act. Patient identification was verified, and a caregiver was present when appropriate. The patient was located at: Home: [address] [address]  The provider was located at: Facility (Appt Department): [address] [place] [address]    --Arline Galan MD on 12/19/2022 at 9:07 AM     Chief Complaint   Patient presents with    Sarcoidosis     History of Present Illness   Micheal Figueredo is a 56 y.o. female was evaluated by synchronous (real-time) audio-video technology from home, through a secure patient portal.    Here for follow up on sarcoid. Completed steroids and facial rash is better. Facial numbness and inability to chew is now better. MRI noted to have nodules in the parotid gland. Patient does not feel them.      Review of Systems   ROS    Vitals/Objective:     General: alert, cooperative, no distress   Mental  status: mental status: alert, oriented to person, place, and time, normal mood, behavior, speech, dress, motor activity, and thought processes   Resp: resp: normal effort and no respiratory distress   Neuro: neuro: no gross deficits   Skin: skin: no discoloration or lesions of concern on visible areas     Due to this being a TeleHealth evaluation, many elements of the physical examination are unable to be assessed. Assessment and Plan:       1. Sarcoidosis of central nervous system  -Improving. Will call Dr. Angel Brian office to follow up. Referral in place. 2. Nodule of parotid gland  -CT ordered. Number to central scheduling given and patient to call to schedule. Time spent in direct conversation with the patient to include medical condition(s) discussed, assessment and treatment plan:    Patient encounter was >20 minutes of total time is spent on the date of the encounter: preparing to see the patient(reviewing prior notes and tests), obtaining and/or reviewing separately obtained history, performing a medially appropriate examination and/or evaluation, counseling and educating the patient, documenting clinical information in the electronic or other health record and care coordination(not separately reported) for imaging studies. We discussed the expected course, resolution and complications of the diagnosis(es) in detail. Medication risks, benefits, costs, interactions, and alternatives were discussed as indicated. I advised her to contact the office if her condition worsens, changes or fails to improve as anticipated. She expressed understanding with the diagnosis(es) and plan. Patient understands that this encounter was a temporary measure, and the importance of further follow up and examination was emphasized. Patient verbalized understanding. Patient informed to follow up:     Electronically Signed: Gómez Wasserman MD    CPT Codes 69291-81258 for Established Patients may apply to this Telehealth Visit. POS code: 18. Lesia Barba is a 56 y.o. female who was evaluated by an audio-video encounter for concerns as above. Patient identification was verified prior to start of the visit. A caregiver was present when appropriate.  Due to this being a TeleHealth encounter (During JKOEJ-07 public health emergency), evaluation of the following organ systems was limited: Vitals/Constitutional/EENT/Resp/CV/GI//MS/Neuro/Skin/Heme-Lymph-Imm. Pursuant to the emergency declaration under the [facility] [place] [address] waiver authority and the [employer] and [employer] Act, this Virtual Visit was conducted, with patient's (and/or legal guardian's) consent, to reduce the patient's risk of exposure to COVID-19 and provide necessary medical care. History   Patients past medical, surgical and family histories were reviewed and updated. Past Medical History:   Diagnosis Date    Acute medial meniscus tear of left knee     Lyme disease     7 years    Multiple thyroid nodules     Osteoarthritis, knee     Sarcoid     Uterine fibroid      Past Surgical History:   Procedure Laterality Date    HX HERNIA REPAIR       Family History   Problem Relation Age of Onset    Diabetes Mother     Hypertension Mother     No Known Problems Father     Thyroid Disease Other     Breast Cancer Paternal Grandmother      Social History     Tobacco Use    Smoking status: Never    Smokeless tobacco: Never   Vaping Use    Vaping Use: Never used   Substance Use Topics    Alcohol use: Yes     Comment: socially    Drug use: No     Patient Active Problem List   Diagnosis Code    Sarcoidosis D86.9    Uterine leiomyoma D25.9    Dysmenorrhea N94.6    Tear of medial meniscus of left knee C66.542E          Current Medications/Allergies   Medications and Allergies reviewed:    Current Outpatient Medications   Medication Sig Dispense Refill    nortriptyline (PAMELOR) 10 mg capsule Take 40 mg by mouth every six (6) hours as needed for PRN Reason (Other) (Headache). predniSONE (DELTASONE) 20 mg tablet Take 1 Tablet by mouth two (2) times a day for 14 days. 28 Tablet 0    levonorgestreL (MIRENA) 20 mcg/24 hours (8 yrs) 52 mg IUD 1 Device by IntraUTERine route once.  Placed Aug 2017       Allergies   Allergen Reactions    Latex, Natural Rubber Sneezing

## 2023-05-17 RX ORDER — NORTRIPTYLINE HYDROCHLORIDE 10 MG/1
40 CAPSULE ORAL EVERY 6 HOURS PRN
COMMUNITY

## 2024-04-05 ENCOUNTER — TELEPHONE (OUTPATIENT)
Age: 49
End: 2024-04-05

## 2024-04-05 NOTE — TELEPHONE ENCOUNTER
Patient called back to schedule appointment for elevated bp. She stated that she has been experiencing blurred vision, dizziness, fatigue, and headaches. I schedule patient for Monday 4/8 at 3 with Dr. Quinteros. Due to symptoms patient may need a call back to discuss further medical advice.

## 2024-04-05 NOTE — TELEPHONE ENCOUNTER
Spoke with patient who identified self with two patient identifiers(name and ).    Patient Active Problem List   Diagnosis    Sarcoidosis    Uterine leiomyoma    Tear of medial meniscus of left knee    Dysmenorrhea       Chief Complaint/Subjective: Elevated Blood Pressure    Current Symptoms:   Fatigue  SOB: Yes; exertion only; not at rest. History of Sarcoidosis.  Chest pain:  No  Visual changes:  Yes; blurry  Slurred speech:  No  Lightheaded: Yes  Racing heart rate or palpitations: No    How high is patients BP: 149/99; 151/99; 149/96  Is patient on any BP meds: No  When and where was BP taken: Yesterday (24) at work      Associated Symptoms:   NA    Onset: 24    Temperature: Denies     Pain Severity: 0      Location:    Pain Quality:     Better/Worse: NA    What has been tried: Nothing    Recommended disposition: Go to Urgent Care for evaluation    Future Appointments   Date Time Provider Department Center   2024  3:00 PM Toby Quinteros MD [facility]       Recommendations:   Go to Urgent Care for evaluation due to current  symptoms  Keep appointment on 2024 for follow up    Reason for Disposition:   Symptomatic with elevated blood pressure    Patient agreed and verbalized understanding to advice provided.    Jhonatan Hartmann LPN

## 2024-04-05 NOTE — TELEPHONE ENCOUNTER
Pt called to request a same day appt for elevated bp. When asked if she was experiencing any Sxs, she replied \"yeah, my bp is 151/99.\" Pt was informed that there were no open appts for today and was offered an appt for Monday, 4/08 at 10:20 with Dr. Wood. Pt refused, stating that she has \"something else to do.\" She was then informed that the next appt was in 2 weeks on 4/18. She accepted the appt for 4/18, but replied \"I hope I don't stroke out.\" This writer informed pt that a nurse would contact her and she was asked again if she is experiencing any Sxs, such as dizziness. She replied \"yeah, that's my symptom, dizziness. She was asked if she was experiencing any other Sxs, she replied \"no.\" She was advised to expect a phone call.

## 2024-04-08 ENCOUNTER — OFFICE VISIT (OUTPATIENT)
Age: 49
End: 2024-04-08
Payer: COMMERCIAL

## 2024-04-08 VITALS
SYSTOLIC BLOOD PRESSURE: 126 MMHG | OXYGEN SATURATION: 98 % | DIASTOLIC BLOOD PRESSURE: 87 MMHG | HEIGHT: 63 IN | HEART RATE: 92 BPM | BODY MASS INDEX: 28.17 KG/M2 | TEMPERATURE: 98.4 F | RESPIRATION RATE: 18 BRPM | WEIGHT: 159 LBS

## 2024-04-08 DIAGNOSIS — G47.33 OSA (OBSTRUCTIVE SLEEP APNEA): ICD-10-CM

## 2024-04-08 DIAGNOSIS — H81.13 BENIGN PAROXYSMAL POSITIONAL VERTIGO DUE TO BILATERAL VESTIBULAR DISORDER: ICD-10-CM

## 2024-04-08 DIAGNOSIS — E07.9 THYROID DISEASE: ICD-10-CM

## 2024-04-08 DIAGNOSIS — I10 PRIMARY HYPERTENSION: Primary | ICD-10-CM

## 2024-04-08 PROCEDURE — 3079F DIAST BP 80-89 MM HG: CPT | Performed by: FAMILY MEDICINE

## 2024-04-08 PROCEDURE — 99214 OFFICE O/P EST MOD 30 MIN: CPT

## 2024-04-08 PROCEDURE — 3074F SYST BP LT 130 MM HG: CPT | Performed by: FAMILY MEDICINE

## 2024-04-08 RX ORDER — MECLIZINE HCL 12.5 MG/1
12.5 TABLET ORAL 3 TIMES DAILY PRN
Qty: 30 TABLET | Refills: 0 | Status: SHIPPED | OUTPATIENT
Start: 2024-04-08 | End: 2024-04-18

## 2024-04-08 SDOH — ECONOMIC STABILITY: FOOD INSECURITY: WITHIN THE PAST 12 MONTHS, THE FOOD YOU BOUGHT JUST DIDN'T LAST AND YOU DIDN'T HAVE MONEY TO GET MORE.: NEVER TRUE

## 2024-04-08 SDOH — ECONOMIC STABILITY: FOOD INSECURITY: WITHIN THE PAST 12 MONTHS, YOU WORRIED THAT YOUR FOOD WOULD RUN OUT BEFORE YOU GOT MONEY TO BUY MORE.: NEVER TRUE

## 2024-04-08 SDOH — ECONOMIC STABILITY: HOUSING INSECURITY
IN THE LAST 12 MONTHS, WAS THERE A TIME WHEN YOU DID NOT HAVE A STEADY PLACE TO SLEEP OR SLEPT IN A SHELTER (INCLUDING NOW)?: NO

## 2024-04-08 SDOH — ECONOMIC STABILITY: INCOME INSECURITY: HOW HARD IS IT FOR YOU TO PAY FOR THE VERY BASICS LIKE FOOD, HOUSING, MEDICAL CARE, AND HEATING?: NOT HARD AT ALL

## 2024-04-08 ASSESSMENT — PATIENT HEALTH QUESTIONNAIRE - PHQ9
SUM OF ALL RESPONSES TO PHQ QUESTIONS 1-9: 0
SUM OF ALL RESPONSES TO PHQ QUESTIONS 1-9: 0
SUM OF ALL RESPONSES TO PHQ9 QUESTIONS 1 & 2: 0
SUM OF ALL RESPONSES TO PHQ QUESTIONS 1-9: 0
SUM OF ALL RESPONSES TO PHQ QUESTIONS 1-9: 0
2. FEELING DOWN, DEPRESSED OR HOPELESS: NOT AT ALL
1. LITTLE INTEREST OR PLEASURE IN DOING THINGS: NOT AT ALL

## 2024-04-08 NOTE — PROGRESS NOTES
Kristine De LaR osa is a 48 y.o. female      Chief Complaint   Patient presents with    Blood Pressure Check     Patient is coming in for a follow up on hypertension. Patient wants to get all labs today. No other concerns.        \"Have you been to the ER, urgent care clinic since your last visit?  Hospitalized since your last visit?\"    NO    “Have you seen or consulted any other health care providers outside of [facility] since your last visit?”    NO    “Have you had a colorectal cancer screening such as a colonoscopy/FIT/Cologuard?    NO    No colonoscopy on file  No cologuard on file  No FIT/FOBT on file   No flexible sigmoidoscopy on file         “Have you had a pap smear?”    YES - Where: 04/03/20[facility] at [facility]    No cervical cancer screening on file            Vitals:    04/08/24 1453   BP: 126/87   Site: Right Upper Arm   Position: Sitting   Pulse: 92   Resp: 18   Temp: 98.4 °F (36.9 °C)   TempSrc: Oral   SpO2: 98%   Weight: 72.1 kg (159 lb)   Height: 1.6 m (5' 3\")            Health Maintenance Due   Topic Date Due    Hepatitis B vaccine (1 of 3 - 3-dose series) Never done    HIV screen  Never done    Hepatitis C screen  Never done    Cervical cancer screen  Never done    Colorectal Cancer Screen  Never done    A1C test (Diabetic or Prediabetic)  06/28/2023    COVID-19 Vaccine (3 - 2023-24 season) 09/01/2023    Depression Screen  12/05/2023         Medication Reconciliation completed, changes noted.  Please  Update medication list.

## 2024-04-08 NOTE — PROGRESS NOTES
[address] [place] [address]   Office [phone], Fax [phone]     Chief Complaint   Patient presents with    Blood Pressure Check     Patient is coming in for a follow up on hypertension. Patient wants to get all labs today. No other concerns.        Follow-up  BP log and blood pressure check  Relief with Epley maneuvers    History of Present Illness:  Kristine De La Rosa is a 48 y.o. female with a PMHx of dysmenorrhea, fibroids, and pulmonary/neurological sarcoidosis who presents to clinic for symptomatic HTN.    # HTN:  - Current meds: None  - BP checked at work, at home, and at a pre-employment physical   - Average SBP: 149-151   - Average DBP: hig 90s  - Smoking: None  - EtOH: occasional  - Sleep: Wakes up in the middle of the night 2-3 times. Wakes up in the morning tired.  Patient notes history of snoring.    Endorses visual changes, headache, dizziness, SOB, palpitations.  Monday, almost fell from headache/dizziness. Been ongoing for several months. Occurring at least daily.      Past Medical History:   Diagnosis Date    Acute medial meniscus tear of left knee     Lyme disease     7 years    Multiple thyroid nodules     Osteoarthritis, knee     Sarcoid     Uterine fibroid        Current Outpatient Medications   Medication Sig Dispense Refill    meclizine (ANTIVERT) 12.5 MG tablet Take 1 tablet by mouth 3 times daily as needed for Dizziness or Nausea 30 tablet 0    levonorgestrel (MIRENA) IUD 52 mg 1 Device by IntraUTERine route once      nortriptyline (PAMELOR) 10 MG capsule Take 4 capsules by mouth every 6 hours as needed (Patient not taking: Reported on 4/8/2024)       No current facility-administered medications for this visit.       Allergies   Allergen Reactions    Latex      Other reaction(s): Sneezing       Social History     Tobacco Use    Smoking status: Never    Smokeless tobacco: Never   Substance Use Topics    Alcohol use: Yes    Drug use: No       Family History   Problem

## 2024-04-09 LAB
BASOPHILS # BLD AUTO: 0 X10E3/UL (ref 0–0.2)
BASOPHILS NFR BLD AUTO: 1 %
BUN SERPL-MCNC: 11 MG/DL (ref 6–24)
BUN/CREAT SERPL: 14 (ref 9–23)
CALCIUM SERPL-MCNC: 9.8 MG/DL (ref 8.7–10.2)
CHLORIDE SERPL-SCNC: 105 MMOL/L (ref 96–106)
CO2 SERPL-SCNC: 23 MMOL/L (ref 20–29)
CREAT SERPL-MCNC: 0.76 MG/DL (ref 0.57–1)
EGFRCR SERPLBLD CKD-EPI 2021: 97 ML/MIN/1.73
EOSINOPHIL # BLD AUTO: 0.3 X10E3/UL (ref 0–0.4)
EOSINOPHIL NFR BLD AUTO: 8 %
ERYTHROCYTE [DISTWIDTH] IN BLOOD BY AUTOMATED COUNT: 14.2 % (ref 11.7–15.4)
GLUCOSE SERPL-MCNC: 95 MG/DL (ref 70–99)
HCT VFR BLD AUTO: 40.8 % (ref 34–46.6)
HGB BLD-MCNC: 13.4 G/DL (ref 11.1–15.9)
IMM GRANULOCYTES # BLD AUTO: 0 X10E3/UL (ref 0–0.1)
IMM GRANULOCYTES NFR BLD AUTO: 0 %
LYMPHOCYTES # BLD AUTO: 0.9 X10E3/UL (ref 0.7–3.1)
LYMPHOCYTES NFR BLD AUTO: 24 %
MCH RBC QN AUTO: 26.1 PG (ref 26.6–33)
MCHC RBC AUTO-ENTMCNC: 32.8 G/DL (ref 31.5–35.7)
MCV RBC AUTO: 80 FL (ref 79–97)
MONOCYTES # BLD AUTO: 0.4 X10E3/UL (ref 0.1–0.9)
MONOCYTES NFR BLD AUTO: 11 %
NEUTROPHILS # BLD AUTO: 2.1 X10E3/UL (ref 1.4–7)
NEUTROPHILS NFR BLD AUTO: 56 %
PLATELET # BLD AUTO: 184 X10E3/UL (ref 150–450)
POTASSIUM SERPL-SCNC: 4 MMOL/L (ref 3.5–5.2)
RBC # BLD AUTO: 5.13 X10E6/UL (ref 3.77–5.28)
SODIUM SERPL-SCNC: 143 MMOL/L (ref 134–144)
SPECIMEN STATUS REPORT: NORMAL
TSH SERPL DL<=0.005 MIU/L-ACNC: 1.33 UIU/ML (ref 0.45–4.5)
WBC # BLD AUTO: 3.7 X10E3/UL (ref 3.4–10.8)

## 2024-04-09 ASSESSMENT — ENCOUNTER SYMPTOMS
CHOKING: 0
NAUSEA: 0
SHORTNESS OF BREATH: 1
DIARRHEA: 0
VOMITING: 0
COUGH: 0
CONSTIPATION: 0

## 2024-04-11 NOTE — RESULT ENCOUNTER NOTE
CBC shows minor hypochromia without microcytosis, unlikely to be true JOSEP, patient would benefit from annual follow-up CBC to evaluate for JOSEP and counseling on increasing iron in diet.  BMP WNL  TSH WNL

## 2024-05-24 ENCOUNTER — OFFICE VISIT (OUTPATIENT)
Age: 49
End: 2024-05-24
Payer: COMMERCIAL

## 2024-05-24 VITALS
SYSTOLIC BLOOD PRESSURE: 139 MMHG | OXYGEN SATURATION: 97 % | RESPIRATION RATE: 19 BRPM | TEMPERATURE: 97.8 F | HEIGHT: 63 IN | BODY MASS INDEX: 28.95 KG/M2 | DIASTOLIC BLOOD PRESSURE: 93 MMHG | WEIGHT: 163.4 LBS | HEART RATE: 91 BPM

## 2024-05-24 DIAGNOSIS — R05.1 ACUTE COUGH: Primary | ICD-10-CM

## 2024-05-24 DIAGNOSIS — H65.192 ACUTE EFFUSION OF LEFT EAR: ICD-10-CM

## 2024-05-24 DIAGNOSIS — D86.0 SARCOIDOSIS, LUNG (HCC): ICD-10-CM

## 2024-05-24 DIAGNOSIS — R06.2 WHEEZING: ICD-10-CM

## 2024-05-24 PROCEDURE — 99213 OFFICE O/P EST LOW 20 MIN: CPT

## 2024-05-24 RX ORDER — BENZONATATE 100 MG/1
100 CAPSULE ORAL 3 TIMES DAILY PRN
Qty: 30 CAPSULE | Refills: 0 | Status: SHIPPED | OUTPATIENT
Start: 2024-05-24 | End: 2024-06-03

## 2024-05-24 RX ORDER — FLUTICASONE PROPIONATE 50 MCG
2 SPRAY, SUSPENSION (ML) NASAL DAILY
Qty: 48 G | Refills: 1 | Status: SHIPPED | OUTPATIENT
Start: 2024-05-24

## 2024-05-24 RX ORDER — ALBUTEROL SULFATE 90 UG/1
2 AEROSOL, METERED RESPIRATORY (INHALATION) 4 TIMES DAILY PRN
Qty: 18 G | Refills: 0 | Status: SHIPPED | OUTPATIENT
Start: 2024-05-24

## 2024-05-24 ASSESSMENT — PATIENT HEALTH QUESTIONNAIRE - PHQ9
SUM OF ALL RESPONSES TO PHQ9 QUESTIONS 1 & 2: 0
SUM OF ALL RESPONSES TO PHQ QUESTIONS 1-9: 0
2. FEELING DOWN, DEPRESSED OR HOPELESS: NOT AT ALL
1. LITTLE INTEREST OR PLEASURE IN DOING THINGS: NOT AT ALL
SUM OF ALL RESPONSES TO PHQ QUESTIONS 1-9: 0

## 2024-05-24 NOTE — PROGRESS NOTES
Chief Complaint   Patient presents with    Cough     The cough started on Sunday, you were up all night coughing. OTC: tylenol for cold, mucinex, allergy medication. Nothing is helping. The cough is getting worse. Patient noticed a post nasal drip as well.      Vitals:    05/24/24 1135   BP: (!) 139/93   Site: Right Upper Arm   Position: Sitting   Cuff Size: Large Adult   Pulse: 91   Resp: 19   Temp: 97.8 °F (36.6 °C)   TempSrc: Temporal   SpO2: 97%   Weight: 74.1 kg (163 lb 6.4 oz)   Height: 1.6 m (5' 3\")     \"Have you been to the ER, urgent care clinic since your last visit?  Hospitalized since your last visit?\"    NO    “Have you seen or consulted any other health care providers outside of [facility] since your last visit?”    NO     “Have you had a pap smear?”    NO    No cervical cancer screening on file         “Have you had a colorectal cancer screening such as a colonoscopy/FIT/Cologuard?    NO    No colonoscopy on file  No cologuard on file  No FIT/FOBT on file   No flexible sigmoidoscopy on file         Click Here for Release of Records Request

## 2024-05-24 NOTE — PROGRESS NOTES
History of Present Illness:    Kristine De La Rosa is a 48 y.o. female who presents for    #cough   - cough since Sunday; productive of clear sputum / tan   - postnasal drip and tickle back of throat  - post tussive emesis   - denies fever/chills, rhinorrhea,   - works in healthcare  - h/o lung sarcoidosis ; not on meds used to follow with pulm  - takes allegra qd for allergies         Past Medical History:   Diagnosis Date    Acute medial meniscus tear of left knee     Lyme disease     7 years    Multiple thyroid nodules     Osteoarthritis, knee     Sarcoid     Uterine fibroid        Past Surgical History:   Procedure Laterality Date    HERNIA REPAIR      IR EMBOLIZATION TUMOR / ORGAN  12/2022       Current Outpatient Medications   Medication Sig Dispense Refill    benzonatate (TESSALON) 100 MG capsule Take 1 capsule by mouth 3 times daily as needed for Cough 30 capsule 0    albuterol sulfate HFA (VENTOLIN HFA) 108 (90 Base) MCG/ACT inhaler Inhale 2 puffs into the lungs 4 times daily as needed for Wheezing 18 g 0    fluticasone (FLONASE) 50 MCG/ACT nasal spray 2 sprays by Each Nostril route daily 48 g 1    levonorgestrel (MIRENA) IUD 52 mg 1 Device by IntraUTERine route once      nortriptyline (PAMELOR) 10 MG capsule Take 4 capsules by mouth every 6 hours as needed (Patient not taking: Reported on 4/8/2024)       No current facility-administered medications for this visit.       Allergies   Allergen Reactions    Latex      Other reaction(s): Sneezing       Social History     Tobacco Use    Smoking status: Never    Smokeless tobacco: Never   Substance Use Topics    Alcohol use: Yes    Drug use: No       Family History   Problem Relation Age of Onset    Breast Cancer Paternal Grandmother     Thyroid Disease Other     Hypertension Mother     Diabetes Mother     No Known Problems Father          Physical Examination:     BP (!) 139/93 (Site: Right Upper Arm, Position: Sitting, Cuff Size: Large Adult)   Pulse 91   Temp

## 2024-05-31 ENCOUNTER — OFFICE VISIT (OUTPATIENT)
Age: 49
End: 2024-05-31
Payer: COMMERCIAL

## 2024-05-31 VITALS
TEMPERATURE: 97.9 F | SYSTOLIC BLOOD PRESSURE: 122 MMHG | WEIGHT: 163 LBS | DIASTOLIC BLOOD PRESSURE: 77 MMHG | HEART RATE: 89 BPM | BODY MASS INDEX: 28.88 KG/M2 | OXYGEN SATURATION: 97 % | HEIGHT: 63 IN | RESPIRATION RATE: 20 BRPM

## 2024-05-31 DIAGNOSIS — D86.0 SARCOIDOSIS, LUNG (HCC): ICD-10-CM

## 2024-05-31 DIAGNOSIS — R05.1 ACUTE COUGH: Primary | ICD-10-CM

## 2024-05-31 PROCEDURE — 99213 OFFICE O/P EST LOW 20 MIN: CPT | Performed by: STUDENT IN AN ORGANIZED HEALTH CARE EDUCATION/TRAINING PROGRAM

## 2024-05-31 RX ORDER — PREDNISONE 20 MG/1
40 TABLET ORAL DAILY
Qty: 10 TABLET | Refills: 0 | Status: SHIPPED | OUTPATIENT
Start: 2024-05-31 | End: 2024-06-05

## 2024-05-31 RX ORDER — PHENTERMINE HYDROCHLORIDE 37.5 MG/1
37.5 TABLET ORAL
COMMUNITY
Start: 2024-05-01

## 2024-05-31 ASSESSMENT — PATIENT HEALTH QUESTIONNAIRE - PHQ9
1. LITTLE INTEREST OR PLEASURE IN DOING THINGS: NOT AT ALL
SUM OF ALL RESPONSES TO PHQ QUESTIONS 1-9: 0
SUM OF ALL RESPONSES TO PHQ9 QUESTIONS 1 & 2: 0
2. FEELING DOWN, DEPRESSED OR HOPELESS: NOT AT ALL
SUM OF ALL RESPONSES TO PHQ QUESTIONS 1-9: 0

## 2024-05-31 NOTE — PROGRESS NOTES
Identified pt with two pt identifiers(name and ). Reviewed record in preparation for visit and have obtained necessary documentation.  Chief Complaint   Patient presents with    X-ray     Cough     X 5 days        Health Maintenance Due   Topic    Hepatitis B vaccine (1 of 3 - 3-dose series)    HIV screen     Hepatitis C screen     Cervical cancer screen     Breast cancer screen     Colorectal Cancer Screen     A1C test (Diabetic or Prediabetic)     COVID-19 Vaccine (3 - 2023-24 season)       Vitals:    24 1459   BP: 122/77   Site: Left Upper Arm   Position: Sitting   Cuff Size: Medium Adult   Pulse: 89   Resp: 20   Temp: 97.9 °F (36.6 °C)   TempSrc: Oral   SpO2: 97%   Weight: 73.9 kg (163 lb)   Height: 1.6 m (5' 3\")         \"Have you been to the ER, urgent care clinic since your last visit?  Hospitalized since your last visit?\"    NO    “Have you seen or consulted any other health care providers outside of [facility] since your last visit?”    NO    Have you had a mammogram?”   YES - Where: [facility] around 2024 Nurse/CMA to request most recent records if not in the chart    No breast cancer screening on file      “Have you had a pap smear?”    YES - Where: [facility] around 2024 Nurse/CMA to request most recent records if not in the chart    No cervical cancer screening on file         “Have you had a colorectal cancer screening such as a colonoscopy/FIT/Cologuard?    NO    No colonoscopy on file  No cologuard on file  No FIT/FOBT on file   No flexible sigmoidoscopy on file         Click Here for Release of Records Request      This patient is accompanied in the office by her self.  I have received verbal consent from Kristine De La Rosa to discuss any/all medical information while they are present in the room.

## 2024-05-31 NOTE — PROGRESS NOTES
[address] [place] [address]   Office [phone], Fax [phone]    Subjective:     Chief Complaint   Patient presents with    X-ray     Cough     X 5 days       HPI:  Kristine De La Rosa is a 48 y.o. female that presents for: Cough follow-up as well as sarcoidosis.  Patient was seen in clinic recently for cough and was advised to continue supportive therapy and follow-up with pulmonology because of patient's underlying sarcoidosis.  Patient has a follow-up with pulmonology in July but in the meantime her cough and wheezing have not improved.  She was also advised by pulmonology that she should get a chest x-ray prior to her pulmonology appointment.  She reports some dyspnea on exertion but denies any chest pain, fever, chills, nausea, vomiting, diarrhea or any complaints time.        ROS:   Review of Systems      Health Maintenance:  Health Maintenance Due   Topic Date Due    Hepatitis B vaccine (1 of 3 - 3-dose series) Never done    HIV screen  Never done    Hepatitis C screen  Never done    Cervical cancer screen  Never done    Breast cancer screen  Never done    Colorectal Cancer Screen  Never done    A1C test (Diabetic or Prediabetic)  06/28/2023    COVID-19 Vaccine (3 - 2023-24 season) 09/01/2023        Past Medical Hx  I personally reviewed.  Past Medical History:   Diagnosis Date    Acute medial meniscus tear of left knee     Lyme disease     7 years    Multiple thyroid nodules     Osteoarthritis, knee     Sarcoid     Uterine fibroid         SocHx   I personally reviewed.  Social Determinants of Health     Tobacco Use: Low Risk  (5/31/2024)    Patient History     Smoking Tobacco Use: Never     Smokeless Tobacco Use: Never     Passive Exposure: Not on file   Alcohol Use: Not on file   Financial Resource Strain: Low Risk  (4/8/2024)    Overall Financial Resource Strain (CARDIA)     Difficulty of Paying Living Expenses: Not hard at all   Food Insecurity: No Food Insecurity (4/8/2024)

## 2024-06-03 DIAGNOSIS — J18.9 PNEUMONIA OF LEFT LUNG DUE TO INFECTIOUS ORGANISM, UNSPECIFIED PART OF LUNG: Primary | ICD-10-CM

## 2024-06-03 RX ORDER — LEVOFLOXACIN 750 MG/1
750 TABLET, FILM COATED ORAL DAILY
Qty: 5 TABLET | Refills: 0 | Status: SHIPPED | OUTPATIENT
Start: 2024-06-03 | End: 2024-06-08

## 2024-06-17 ASSESSMENT — SLEEP AND FATIGUE QUESTIONNAIRES
ARE YOU BOTHERED BY WAKING UP TOO EARLY AND NOT BEING ABLE TO GET BACK TO SLEEP: NO
HAS YOUR MOOD BEEN AFFECTED BECAUSE YOU ARE SLEEPY OR TIRED: NO
DO YOU HAVE DIFFICULTY BEING AS ACTIVE AS YOU WANT TO BE IN THE MORNING BECAUSE YOU ARE SLEEPY OR TIRED: YES, LITTLE
NUMBER OF TIMES YOU WAKE PER NIGHT: 3
AVERAGE NUMBER OF SLEEP HOURS: 6
HOW LIKELY ARE YOU TO NOD OFF OR FALL ASLEEP IN A CAR, WHILE STOPPED FOR A FEW MINUTES IN TRAFFIC: WOULD NEVER DOZE
HOW LIKELY ARE YOU TO NOD OFF OR FALL ASLEEP WHILE WATCHING TV: MODERATE CHANCE OF DOZING
DO YOU TAKE NAPS: NO
DO YOU HAVE DIFFICULTY CONCENTRATING ON THE THINGS YOU DO BECAUSE YOU ARE SLEEPY OR TIRED: NO
HOW LIKELY ARE YOU TO NOD OFF OR FALL ASLEEP WHEN YOU ARE A PASSENGER IN A CAR FOR AN HOUR WITHOUT A BREAK: MODERATE CHANCE OF DOZING
HOW LIKELY ARE YOU TO NOD OFF OR FALL ASLEEP WHEN YOU ARE A PASSENGER IN A CAR FOR AN HOUR WITHOUT A BREAK: MODERATE CHANCE OF DOZING
ARE YOU BOTHERED BY WAKING UP TOO EARLY AND NOT BEING ABLE TO GET BACK TO SLEEP: NO
HOW LIKELY ARE YOU TO NOD OFF OR FALL ASLEEP WHILE WATCHING TV: MODERATE CHANCE OF DOZING
DO YOU HAVE DIFFICULTY OPERATING A MOTOR VEHICLE FOR SHORT DISTANCES (LESS THAN 100 MILES) BECAUSE YOU BECOME SLEEPY: NO
HOW LIKELY ARE YOU TO NOD OFF OR FALL ASLEEP WHILE SITTING QUIETLY AFTER LUNCH WITHOUT ALCOHOL: MODERATE CHANCE OF DOZING
DO YOU WORK SHIFTS: YES
HOW LIKELY ARE YOU TO NOD OFF OR FALL ASLEEP IN A CAR, WHILE STOPPED FOR A FEW MINUTES IN TRAFFIC: WOULD NEVER DOZE
HOW LIKELY ARE YOU TO NOD OFF OR FALL ASLEEP WHILE SITTING QUIETLY AFTER LUNCH WITHOUT ALCOHOL: MODERATE CHANCE OF DOZING
HOW LIKELY ARE YOU TO NOD OFF OR FALL ASLEEP WHILE SITTING INACTIVE IN A PUBLIC PLACE: MODERATE CHANCE OF DOZING
DO YOU GENERALLY HAVE DIFFICULTY REMEMBERING THINGS BECAUSE YOU ARE SLEEPY OR TIRED: NO
SELECT ANY OF THE FOLLOWING BEHAVIORS OBSERVED WHILE PATIENT ASLEEP: LIGHT SNORING
DO YOU GET TOO LITTLE SLEEP AT NIGHT: YES
HOW LIKELY ARE YOU TO NOD OFF OR FALL ASLEEP WHILE LYING DOWN TO REST IN THE AFTERNOON WHEN CIRCUMSTANCES PERMIT: MODERATE CHANCE OF DOZING
HAS YOUR RELATIONSHIP WITH FAMILY, FRIENDS OR WORK COLLEAGUES BEEN AFFECTED BECAUSE YOU ARE SLEEPY OR TIRED: NO
HOW LIKELY ARE YOU TO NOD OFF OR FALL ASLEEP WHILE SITTING AND TALKING TO SOMEONE: WOULD NEVER DOZE
HOW LIKELY ARE YOU TO NOD OFF OR FALL ASLEEP WHILE SITTING INACTIVE IN A PUBLIC PLACE: MODERATE CHANCE OF DOZING
HOW LIKELY ARE YOU TO NOD OFF OR FALL ASLEEP WHILE SITTING AND TALKING TO SOMEONE: WOULD NEVER DOZE
DO YOU GET TOO LITTLE SLEEP AT NIGHT: YES
DO YOU HAVE DIFFICULTY VISITING YOUR FAMILY OR FRIENDS IN THEIR HOME BECAUSE YOU BECOME SLEEPY OR TIRED: NO
FOSQ SCORE: 18.5
DO YOU HAVE PROBLEMS WITH FREQUENT AWAKENINGS AT NIGHT: YES
ESS TOTAL SCORE: 12
DO YOU HAVE DIFFICULTY OPERATING A MOTOR VEHICLE FOR LONG DISTANCES (GREATER THAN 100 MILES) BECAUSE YOU BECOME SLEEPY: NO
AVERAGE NUMBER OF SLEEP HOURS: 6
HOW LIKELY ARE YOU TO NOD OFF OR FALL ASLEEP WHILE LYING DOWN TO REST IN THE AFTERNOON WHEN CIRCUMSTANCES PERMIT: MODERATE CHANCE OF DOZING
DO YOU HAVE DIFFICULTY BEING AS ACTIVE AS YOU WANT TO BE IN THE EVENING BECAUSE YOU ARE SLEEPY OR TIRED: YES, LITTLE
DO YOU HAVE DIFFICULTY WATCHING A MOVIE OR VIDEO BECAUSE YOU BECOME SLEEPY OR TIRED: YES, A LITTLE
HOW LIKELY ARE YOU TO NOD OFF OR FALL ASLEEP WHILE SITTING AND READING: MODERATE CHANCE OF DOZING
HOW LIKELY ARE YOU TO NOD OFF OR FALL ASLEEP WHILE SITTING AND READING: MODERATE CHANCE OF DOZING
NECK CIRCUMFERENCE (INCHES): 13
SELECT ANY OF THE FOLLOWING BEHAVIORS OBSERVED WHILE YOU ARE ASLEEP: LIGHT SNORING
WHAT SHIFT DO YOU WORK: FIRST SHIFT

## 2024-06-18 ENCOUNTER — OFFICE VISIT (OUTPATIENT)
Age: 49
End: 2024-06-18
Payer: COMMERCIAL

## 2024-06-18 VITALS
HEIGHT: 63 IN | HEART RATE: 87 BPM | DIASTOLIC BLOOD PRESSURE: 84 MMHG | BODY MASS INDEX: 28.35 KG/M2 | SYSTOLIC BLOOD PRESSURE: 137 MMHG | OXYGEN SATURATION: 98 % | WEIGHT: 160 LBS

## 2024-06-18 DIAGNOSIS — G47.19 EXCESSIVE DAYTIME SLEEPINESS: ICD-10-CM

## 2024-06-18 DIAGNOSIS — G47.33 OSA (OBSTRUCTIVE SLEEP APNEA): Primary | ICD-10-CM

## 2024-06-18 PROCEDURE — 99203 OFFICE O/P NEW LOW 30 MIN: CPT | Performed by: SPECIALIST

## 2024-06-18 NOTE — PROGRESS NOTES
consistent with sleep disordered breathing.      Plan:     * Patient has a history and examination consistent with the diagnosis of sleep apnea.  *Home sleep testing was ordered for initial evaluation.    * She was provided information on sleep apnea including corresponding risk factors and the importance of proper treatment.   * Treatment options if indicated were reviewed today.      Instructions:  Do not engage in activities requiring a normal degree of alertness if fatigue is present.  The patient understands that untreated or undertreated sleep apnea could impair judgement and the ability to function normally during the day.  Call or return if symptoms worsen or persist.          German Scott MD, [facility]  Electronically signed 06/18/24       This note was created using voice recognition software. Despite editing, there may be syntax errors.  This note will not be viewable in [employer].

## 2024-08-02 DIAGNOSIS — D86.0 SARCOIDOSIS, LUNG (HCC): ICD-10-CM

## 2024-08-02 DIAGNOSIS — R05.1 ACUTE COUGH: ICD-10-CM

## 2024-08-06 NOTE — TELEPHONE ENCOUNTER
Medication Refill Request    Kristine De La Rosa is requesting a refill of the following medication(s):   Requested Prescriptions     Pending Prescriptions Disp Refills    mometasone (ASMANEX, 120 METERED DOSES,) 220 MCG/ACT AEPB inhaler [Pharmacy Med Name: ASMANEX TWISTHALR 220 MCG #120]  0        Listed PCP is Oscar Mendez MD   Last provider to prescribe medication: Dr. Lyles prescribed Pulmicort on 5/31/24  Date of Last Office Visit at [facility]: 5/31/24 with Dr. Lyles    FUTURE APPOINTMENT: No future appointments.    Please send refill to:    [facility]/pharmacy #[id number] - [place], [place] - [address] [facility] - P [phone] - F [phone]  [address] [place] [address]  Phone: [phone] Fax: [phone]      Please review request and approve or deny with recommendations within 48 hours.